# Patient Record
Sex: MALE | Race: WHITE | NOT HISPANIC OR LATINO | URBAN - METROPOLITAN AREA
[De-identification: names, ages, dates, MRNs, and addresses within clinical notes are randomized per-mention and may not be internally consistent; named-entity substitution may affect disease eponyms.]

---

## 2023-04-12 ENCOUNTER — TELEPHONE (OUTPATIENT)
Dept: PRIMARY CARE | Facility: CLINIC | Age: 68
End: 2023-04-12
Payer: MEDICARE

## 2023-04-13 ENCOUNTER — TELEPHONE (OUTPATIENT)
Dept: PRIMARY CARE | Facility: CLINIC | Age: 68
End: 2023-04-13
Payer: MEDICARE

## 2023-04-17 DIAGNOSIS — G43.809 OTHER MIGRAINE WITHOUT STATUS MIGRAINOSUS, NOT INTRACTABLE: Primary | ICD-10-CM

## 2023-04-17 PROBLEM — J45.909 ASTHMA (HHS-HCC): Status: ACTIVE | Noted: 2023-04-17

## 2023-04-17 PROBLEM — M54.9 BACK PAIN, CHRONIC: Status: ACTIVE | Noted: 2023-04-17

## 2023-04-17 PROBLEM — E55.9 VITAMIN D DEFICIENCY: Status: ACTIVE | Noted: 2023-04-17

## 2023-04-17 PROBLEM — G89.29 BACK PAIN, CHRONIC: Status: ACTIVE | Noted: 2023-04-17

## 2023-04-17 PROBLEM — R41.3 MEMORY LOSS: Status: ACTIVE | Noted: 2023-04-17

## 2023-04-17 PROBLEM — J31.0 CHRONIC RHINITIS: Status: ACTIVE | Noted: 2023-04-17

## 2023-04-17 PROBLEM — M19.90 ARTHRITIS: Status: ACTIVE | Noted: 2023-04-17

## 2023-04-17 PROBLEM — K59.00 CONSTIPATION: Status: ACTIVE | Noted: 2023-04-17

## 2023-04-17 PROBLEM — R13.10 DYSPHAGIA: Status: ACTIVE | Noted: 2023-04-17

## 2023-04-17 PROBLEM — R91.1 LUNG NODULE: Status: ACTIVE | Noted: 2023-04-17

## 2023-04-17 PROBLEM — G44.40 MEDICATION OVERUSE HEADACHE: Status: ACTIVE | Noted: 2023-04-17

## 2023-04-17 PROBLEM — R42 LIGHTHEADEDNESS: Status: ACTIVE | Noted: 2023-04-17

## 2023-04-17 PROBLEM — F32.A DEPRESSION: Status: ACTIVE | Noted: 2023-04-17

## 2023-04-17 PROBLEM — K21.9 CHRONIC GERD: Status: ACTIVE | Noted: 2023-04-17

## 2023-04-17 PROBLEM — M54.50 LOW BACK PAIN: Status: ACTIVE | Noted: 2023-04-17

## 2023-04-17 PROBLEM — J04.0 LARYNGITIS: Status: ACTIVE | Noted: 2023-04-17

## 2023-04-17 PROBLEM — J45.909 REACTIVE AIRWAY DISEASE (HHS-HCC): Status: ACTIVE | Noted: 2023-04-17

## 2023-04-17 PROBLEM — H90.5 HIGH FREQUENCY SENSORINEURAL HEARING LOSS OF LEFT EAR: Status: ACTIVE | Noted: 2023-04-17

## 2023-04-17 PROBLEM — R51.9 HEADACHE: Status: ACTIVE | Noted: 2023-04-17

## 2023-04-17 PROBLEM — I10 BENIGN ESSENTIAL HYPERTENSION: Status: ACTIVE | Noted: 2023-04-17

## 2023-04-17 PROBLEM — M47.812 ARTHRITIS OF NECK: Status: ACTIVE | Noted: 2023-04-17

## 2023-04-17 PROBLEM — K44.9 PARAESOPHAGEAL HIATAL HERNIA: Status: ACTIVE | Noted: 2023-04-17

## 2023-04-17 PROBLEM — R41.81 AGE-RELATED COGNITIVE DECLINE: Status: ACTIVE | Noted: 2023-04-17

## 2023-04-17 PROBLEM — F43.23 ADJUSTMENT DISORDER WITH MIXED ANXIETY AND DEPRESSED MOOD: Status: ACTIVE | Noted: 2023-04-17

## 2023-04-17 PROBLEM — G43.711 CHRONIC MIGRAINE WITHOUT AURA, WITH INTRACTABLE MIGRAINE, SO STATED, WITH STATUS MIGRAINOSUS: Status: ACTIVE | Noted: 2023-04-17

## 2023-04-17 PROBLEM — G47.33 OSA (OBSTRUCTIVE SLEEP APNEA): Status: ACTIVE | Noted: 2023-04-17

## 2023-04-17 PROBLEM — F41.8 DEPRESSION WITH ANXIETY: Status: ACTIVE | Noted: 2023-04-17

## 2023-04-17 PROBLEM — R07.89 CHEST PAIN, ATYPICAL: Status: ACTIVE | Noted: 2023-04-17

## 2023-04-17 PROBLEM — D22.4 ATYPICAL NEVUS OF SCALP: Status: ACTIVE | Noted: 2023-04-17

## 2023-04-17 PROBLEM — J06.9 VIRAL URI WITH COUGH: Status: ACTIVE | Noted: 2023-04-17

## 2023-04-17 PROBLEM — K22.70 BARRETT'S ESOPHAGUS: Status: ACTIVE | Noted: 2023-04-17

## 2023-04-17 PROBLEM — M48.02 STENOSIS, CERVICAL SPINE: Status: ACTIVE | Noted: 2023-04-17

## 2023-04-17 PROBLEM — M50.20 DISPLACEMENT OF INTERVERTEBRAL DISC OF CERVICAL REGION: Status: ACTIVE | Noted: 2023-04-17

## 2023-04-17 PROBLEM — R06.02 SHORTNESS OF BREATH ON EXERTION: Status: ACTIVE | Noted: 2023-04-17

## 2023-04-17 PROBLEM — G47.61 PERIODIC LIMB MOVEMENTS OF SLEEP: Status: ACTIVE | Noted: 2023-04-17

## 2023-04-17 PROBLEM — M54.12 CERVICAL RADICULOPATHY: Status: ACTIVE | Noted: 2023-04-17

## 2023-04-17 PROBLEM — G45.9 TIA (TRANSIENT ISCHEMIC ATTACK): Status: ACTIVE | Noted: 2023-04-17

## 2023-04-17 PROBLEM — M47.812 SPONDYLOSIS OF CERVICAL REGION WITHOUT MYELOPATHY OR RADICULOPATHY: Status: ACTIVE | Noted: 2023-04-17

## 2023-04-17 PROBLEM — R73.9 HYPERGLYCEMIA: Status: ACTIVE | Noted: 2023-04-17

## 2023-04-17 PROBLEM — J30.9 ALLERGIC RHINITIS: Status: ACTIVE | Noted: 2023-04-17

## 2023-04-17 PROBLEM — E78.5 HYPERLIPIDEMIA: Status: ACTIVE | Noted: 2023-04-17

## 2023-04-17 PROBLEM — F10.21 ALCOHOLISM IN REMISSION (MULTI): Status: ACTIVE | Noted: 2023-04-17

## 2023-04-17 PROBLEM — M79.18 MYOFASCIAL PAIN ON LEFT SIDE: Status: ACTIVE | Noted: 2023-04-17

## 2023-04-17 PROBLEM — E53.8 VITAMIN B 12 DEFICIENCY: Status: ACTIVE | Noted: 2023-04-17

## 2023-04-17 PROBLEM — R35.1 NOCTURIA: Status: ACTIVE | Noted: 2023-04-17

## 2023-04-17 PROBLEM — H93.19 TINNITUS: Status: ACTIVE | Noted: 2023-04-17

## 2023-04-17 PROBLEM — G56.00 CARPAL TUNNEL SYNDROME: Status: ACTIVE | Noted: 2023-04-17

## 2023-04-17 RX ORDER — COVID-19 MOLECULAR TEST ASSAY
KIT MISCELLANEOUS
COMMUNITY
Start: 2022-05-14 | End: 2023-08-04 | Stop reason: ALTCHOICE

## 2023-04-17 RX ORDER — ESCITALOPRAM OXALATE 10 MG/1
1 TABLET ORAL DAILY
COMMUNITY
Start: 2021-08-19 | End: 2023-08-04 | Stop reason: ALTCHOICE

## 2023-04-17 RX ORDER — ACETAMINOPHEN 160 MG/5ML
1 SUSPENSION, ORAL (FINAL DOSE FORM) ORAL 2 TIMES DAILY
COMMUNITY
End: 2023-08-04 | Stop reason: ALTCHOICE

## 2023-04-17 RX ORDER — MULTIVITAMIN
1 TABLET ORAL DAILY
COMMUNITY
Start: 2014-02-25

## 2023-04-17 RX ORDER — HYDROGEN PEROXIDE 3 %
20 SOLUTION, NON-ORAL MISCELLANEOUS 2 TIMES DAILY
COMMUNITY
Start: 2018-10-05

## 2023-04-17 RX ORDER — ASCORBIC ACID 500 MG
TABLET,CHEWABLE ORAL
COMMUNITY
Start: 2016-07-25 | End: 2023-08-04 | Stop reason: ALTCHOICE

## 2023-04-17 RX ORDER — AMLODIPINE BESYLATE 5 MG/1
1 TABLET ORAL DAILY
COMMUNITY
Start: 2015-09-23 | End: 2023-08-04 | Stop reason: ALTCHOICE

## 2023-04-17 RX ORDER — SUMATRIPTAN SUCCINATE 100 MG/1
100 TABLET ORAL ONCE AS NEEDED
Qty: 9 TABLET | Refills: 3 | Status: SHIPPED | OUTPATIENT
Start: 2023-04-17 | End: 2023-12-18 | Stop reason: SDUPTHER

## 2023-04-17 RX ORDER — UBIDECARENONE 75 MG
CAPSULE ORAL
COMMUNITY
End: 2023-08-04 | Stop reason: ALTCHOICE

## 2023-04-17 RX ORDER — OSELTAMIVIR PHOSPHATE 75 MG/1
1 CAPSULE ORAL 2 TIMES DAILY
COMMUNITY
Start: 2022-12-05 | End: 2023-08-04 | Stop reason: ALTCHOICE

## 2023-04-17 RX ORDER — GUAIFENESIN AND PHENYLEPHRINE HCL 400; 10 MG/1; MG/1
1 TABLET ORAL 2 TIMES DAILY
COMMUNITY
Start: 2017-12-01

## 2023-04-17 RX ORDER — PREDNISONE 10 MG/1
TABLET ORAL
COMMUNITY
Start: 2022-05-17 | End: 2023-08-04 | Stop reason: ALTCHOICE

## 2023-04-17 RX ORDER — AZITHROMYCIN 250 MG/1
TABLET, FILM COATED ORAL
COMMUNITY
Start: 2022-05-17 | End: 2023-08-04 | Stop reason: ALTCHOICE

## 2023-04-17 RX ORDER — SUMATRIPTAN SUCCINATE 100 MG/1
100 TABLET ORAL
COMMUNITY
End: 2023-04-17 | Stop reason: SDUPTHER

## 2023-04-17 RX ORDER — ACETAMINOPHEN 500 MG
TABLET ORAL
COMMUNITY
End: 2023-08-04 | Stop reason: ALTCHOICE

## 2023-04-17 RX ORDER — ONDANSETRON 4 MG/1
1 TABLET, FILM COATED ORAL EVERY 6 HOURS PRN
COMMUNITY
Start: 2022-11-15 | End: 2023-08-04 | Stop reason: ALTCHOICE

## 2023-04-17 RX ORDER — INDOMETHACIN 25 MG/1
CAPSULE ORAL
COMMUNITY
Start: 2014-02-25 | End: 2023-05-04 | Stop reason: SDUPTHER

## 2023-05-04 ENCOUNTER — OFFICE VISIT (OUTPATIENT)
Dept: PRIMARY CARE | Facility: CLINIC | Age: 68
End: 2023-05-04
Payer: MEDICARE

## 2023-05-04 ENCOUNTER — LAB (OUTPATIENT)
Dept: LAB | Facility: LAB | Age: 68
End: 2023-05-04
Payer: MEDICARE

## 2023-05-04 VITALS
SYSTOLIC BLOOD PRESSURE: 128 MMHG | HEART RATE: 76 BPM | DIASTOLIC BLOOD PRESSURE: 82 MMHG | WEIGHT: 184 LBS | OXYGEN SATURATION: 99 % | BODY MASS INDEX: 24.28 KG/M2

## 2023-05-04 DIAGNOSIS — K31.84 GASTROPARESIS: ICD-10-CM

## 2023-05-04 DIAGNOSIS — R73.9 HYPERGLYCEMIA: ICD-10-CM

## 2023-05-04 DIAGNOSIS — Z13.6 ENCOUNTER FOR SCREENING FOR CARDIOVASCULAR DISORDERS: ICD-10-CM

## 2023-05-04 DIAGNOSIS — J45.909 ASTHMA, UNSPECIFIED ASTHMA SEVERITY, UNSPECIFIED WHETHER COMPLICATED, UNSPECIFIED WHETHER PERSISTENT (HHS-HCC): ICD-10-CM

## 2023-05-04 DIAGNOSIS — I10 BENIGN ESSENTIAL HYPERTENSION: ICD-10-CM

## 2023-05-04 DIAGNOSIS — Z13.29 SCREENING FOR THYROID DISORDER: ICD-10-CM

## 2023-05-04 DIAGNOSIS — K21.9 CHRONIC GERD: ICD-10-CM

## 2023-05-04 DIAGNOSIS — E78.5 HYPERLIPIDEMIA, UNSPECIFIED HYPERLIPIDEMIA TYPE: ICD-10-CM

## 2023-05-04 DIAGNOSIS — M54.12 CERVICAL RADICULOPATHY: ICD-10-CM

## 2023-05-04 DIAGNOSIS — Z13.0 SCREENING FOR DEFICIENCY ANEMIA: Primary | ICD-10-CM

## 2023-05-04 DIAGNOSIS — Z13.0 SCREENING FOR DEFICIENCY ANEMIA: ICD-10-CM

## 2023-05-04 DIAGNOSIS — Z00.00 ROUTINE GENERAL MEDICAL EXAMINATION AT A HEALTH CARE FACILITY: ICD-10-CM

## 2023-05-04 DIAGNOSIS — Z12.5 PROSTATE CANCER SCREENING: ICD-10-CM

## 2023-05-04 PROBLEM — K59.1 FUNCTIONAL DIARRHEA: Status: ACTIVE | Noted: 2023-04-25

## 2023-05-04 PROBLEM — R14.2 BELCHING: Status: ACTIVE | Noted: 2023-01-11

## 2023-05-04 PROBLEM — M15.9 GENERALIZED OSTEOARTHRITIS: Status: ACTIVE | Noted: 2017-06-08

## 2023-05-04 PROBLEM — J06.9 VIRAL URI WITH COUGH: Status: RESOLVED | Noted: 2023-04-17 | Resolved: 2023-05-04

## 2023-05-04 LAB
ALANINE AMINOTRANSFERASE (SGPT) (U/L) IN SER/PLAS: 18 U/L (ref 10–52)
ALBUMIN (G/DL) IN SER/PLAS: 4.9 G/DL (ref 3.4–5)
ALKALINE PHOSPHATASE (U/L) IN SER/PLAS: 71 U/L (ref 33–136)
ANION GAP IN SER/PLAS: 14 MMOL/L (ref 10–20)
ASPARTATE AMINOTRANSFERASE (SGOT) (U/L) IN SER/PLAS: 19 U/L (ref 9–39)
BILIRUBIN TOTAL (MG/DL) IN SER/PLAS: 0.7 MG/DL (ref 0–1.2)
CALCIUM (MG/DL) IN SER/PLAS: 10.4 MG/DL (ref 8.6–10.6)
CARBON DIOXIDE, TOTAL (MMOL/L) IN SER/PLAS: 30 MMOL/L (ref 21–32)
CHLORIDE (MMOL/L) IN SER/PLAS: 101 MMOL/L (ref 98–107)
CHOLESTEROL (MG/DL) IN SER/PLAS: 213 MG/DL (ref 0–199)
CHOLESTEROL IN HDL (MG/DL) IN SER/PLAS: 51.3 MG/DL
CHOLESTEROL/HDL RATIO: 4.2
COBALAMIN (VITAMIN B12) (PG/ML) IN SER/PLAS: 795 PG/ML (ref 211–911)
CREATININE (MG/DL) IN SER/PLAS: 1.17 MG/DL (ref 0.5–1.3)
ERYTHROCYTE DISTRIBUTION WIDTH (RATIO) BY AUTOMATED COUNT: 13.2 % (ref 11.5–14.5)
ERYTHROCYTE MEAN CORPUSCULAR HEMOGLOBIN CONCENTRATION (G/DL) BY AUTOMATED: 32.9 G/DL (ref 32–36)
ERYTHROCYTE MEAN CORPUSCULAR VOLUME (FL) BY AUTOMATED COUNT: 85 FL (ref 80–100)
ERYTHROCYTES (10*6/UL) IN BLOOD BY AUTOMATED COUNT: 5.66 X10E12/L (ref 4.5–5.9)
ESTIMATED AVERAGE GLUCOSE FOR HBA1C: 120 MG/DL
GFR MALE: 67 ML/MIN/1.73M2
GLUCOSE (MG/DL) IN SER/PLAS: 121 MG/DL (ref 74–99)
HEMATOCRIT (%) IN BLOOD BY AUTOMATED COUNT: 48 % (ref 41–52)
HEMOGLOBIN (G/DL) IN BLOOD: 15.8 G/DL (ref 13.5–17.5)
HEMOGLOBIN A1C/HEMOGLOBIN TOTAL IN BLOOD: 5.8 %
LDL: 139 MG/DL (ref 0–99)
LEUKOCYTES (10*3/UL) IN BLOOD BY AUTOMATED COUNT: 8.6 X10E9/L (ref 4.4–11.3)
NRBC (PER 100 WBCS) BY AUTOMATED COUNT: 0 /100 WBC (ref 0–0)
PLATELETS (10*3/UL) IN BLOOD AUTOMATED COUNT: 261 X10E9/L (ref 150–450)
POTASSIUM (MMOL/L) IN SER/PLAS: 4.3 MMOL/L (ref 3.5–5.3)
PROTEIN TOTAL: 7.2 G/DL (ref 6.4–8.2)
SODIUM (MMOL/L) IN SER/PLAS: 141 MMOL/L (ref 136–145)
THYROTROPIN (MIU/L) IN SER/PLAS BY DETECTION LIMIT <= 0.05 MIU/L: 2.56 MIU/L (ref 0.44–3.98)
TRIGLYCERIDE (MG/DL) IN SER/PLAS: 114 MG/DL (ref 0–149)
UREA NITROGEN (MG/DL) IN SER/PLAS: 11 MG/DL (ref 6–23)
VLDL: 23 MG/DL (ref 0–40)

## 2023-05-04 PROCEDURE — 84443 ASSAY THYROID STIM HORMONE: CPT

## 2023-05-04 PROCEDURE — 36415 COLL VENOUS BLD VENIPUNCTURE: CPT

## 2023-05-04 PROCEDURE — 82607 VITAMIN B-12: CPT

## 2023-05-04 PROCEDURE — 1160F RVW MEDS BY RX/DR IN RCRD: CPT | Performed by: STUDENT IN AN ORGANIZED HEALTH CARE EDUCATION/TRAINING PROGRAM

## 2023-05-04 PROCEDURE — 1159F MED LIST DOCD IN RCRD: CPT | Performed by: STUDENT IN AN ORGANIZED HEALTH CARE EDUCATION/TRAINING PROGRAM

## 2023-05-04 PROCEDURE — 85027 COMPLETE CBC AUTOMATED: CPT

## 2023-05-04 PROCEDURE — 80053 COMPREHEN METABOLIC PANEL: CPT

## 2023-05-04 PROCEDURE — 80061 LIPID PANEL: CPT

## 2023-05-04 PROCEDURE — 83036 HEMOGLOBIN GLYCOSYLATED A1C: CPT

## 2023-05-04 PROCEDURE — 1170F FXNL STATUS ASSESSED: CPT | Performed by: STUDENT IN AN ORGANIZED HEALTH CARE EDUCATION/TRAINING PROGRAM

## 2023-05-04 PROCEDURE — 3079F DIAST BP 80-89 MM HG: CPT | Performed by: STUDENT IN AN ORGANIZED HEALTH CARE EDUCATION/TRAINING PROGRAM

## 2023-05-04 PROCEDURE — 3074F SYST BP LT 130 MM HG: CPT | Performed by: STUDENT IN AN ORGANIZED HEALTH CARE EDUCATION/TRAINING PROGRAM

## 2023-05-04 PROCEDURE — G0439 PPPS, SUBSEQ VISIT: HCPCS | Performed by: STUDENT IN AN ORGANIZED HEALTH CARE EDUCATION/TRAINING PROGRAM

## 2023-05-04 RX ORDER — SIMVASTATIN 40 MG/1
TABLET, FILM COATED ORAL
COMMUNITY
Start: 2009-03-23 | End: 2023-08-04 | Stop reason: ALTCHOICE

## 2023-05-04 RX ORDER — INDOMETHACIN 25 MG/1
25 CAPSULE ORAL
COMMUNITY
End: 2023-05-04 | Stop reason: SDUPTHER

## 2023-05-04 RX ORDER — CETIRIZINE HYDROCHLORIDE, PSEUDOEPHEDRINE HYDROCHLORIDE 5; 120 MG/1; MG/1
TABLET, FILM COATED, EXTENDED RELEASE ORAL
COMMUNITY
End: 2023-08-04 | Stop reason: ALTCHOICE

## 2023-05-04 RX ORDER — FLUOROURACIL 50 MG/G
CREAM TOPICAL
COMMUNITY
End: 2023-08-04 | Stop reason: ALTCHOICE

## 2023-05-04 RX ORDER — INDOMETHACIN 25 MG/1
25 CAPSULE ORAL
Qty: 60 CAPSULE | Refills: 11 | Status: SHIPPED | OUTPATIENT
Start: 2023-05-04 | End: 2024-05-03

## 2023-05-04 RX ORDER — TOPIRAMATE 25 MG/1
TABLET ORAL
COMMUNITY
End: 2023-08-04 | Stop reason: ALTCHOICE

## 2023-05-04 RX ORDER — AMITRIPTYLINE HYDROCHLORIDE 50 MG/1
TABLET, FILM COATED ORAL
COMMUNITY
End: 2023-08-04 | Stop reason: ALTCHOICE

## 2023-05-04 RX ORDER — METOPROLOL SUCCINATE 50 MG/1
TABLET, EXTENDED RELEASE ORAL
COMMUNITY
End: 2023-08-04 | Stop reason: ALTCHOICE

## 2023-05-04 RX ORDER — METOCLOPRAMIDE 5 MG/1
TABLET ORAL
COMMUNITY
Start: 2021-05-28 | End: 2023-08-04 | Stop reason: ALTCHOICE

## 2023-05-04 RX ORDER — PANCRELIPASE 24000; 76000; 120000 [USP'U]/1; [USP'U]/1; [USP'U]/1
CAPSULE, DELAYED RELEASE PELLETS ORAL
COMMUNITY
Start: 2021-05-28 | End: 2023-08-04 | Stop reason: ALTCHOICE

## 2023-05-04 RX ORDER — ASPIRIN 81 MG/1
81 TABLET ORAL DAILY
COMMUNITY

## 2023-05-04 RX ORDER — PRAVASTATIN SODIUM 40 MG/1
TABLET ORAL
COMMUNITY
End: 2023-08-04 | Stop reason: ALTCHOICE

## 2023-05-04 RX ORDER — DOCUSATE SODIUM 100 MG/1
CAPSULE, LIQUID FILLED ORAL
COMMUNITY
Start: 2017-06-03 | End: 2023-08-04 | Stop reason: ALTCHOICE

## 2023-05-04 RX ORDER — NIACIN (INOSITOL NIACINATE) 400(500MG)
CAPSULE ORAL
COMMUNITY
End: 2023-08-04 | Stop reason: ALTCHOICE

## 2023-05-04 ASSESSMENT — ENCOUNTER SYMPTOMS
LOSS OF SENSATION IN FEET: 0
OCCASIONAL FEELINGS OF UNSTEADINESS: 0
DEPRESSION: 0

## 2023-05-04 ASSESSMENT — PATIENT HEALTH QUESTIONNAIRE - PHQ9
1. LITTLE INTEREST OR PLEASURE IN DOING THINGS: NOT AT ALL
2. FEELING DOWN, DEPRESSED OR HOPELESS: NOT AT ALL
SUM OF ALL RESPONSES TO PHQ9 QUESTIONS 1 AND 2: 0

## 2023-05-04 ASSESSMENT — ACTIVITIES OF DAILY LIVING (ADL)
TAKING_MEDICATION: INDEPENDENT
BATHING: INDEPENDENT
DOING_HOUSEWORK: INDEPENDENT
MANAGING_FINANCES: INDEPENDENT
DRESSING: INDEPENDENT
GROCERY_SHOPPING: INDEPENDENT

## 2023-05-04 NOTE — PATIENT INSTRUCTIONS
1. Medicare wellness.  No concerns on exam.  Screening labs ordered today.  Pneumonia shots are up-to-date.  Excellent job with diet and exercise.  Very good weight no longer needing blood pressure pills.    2. History of Hernandez's esophagus, chronic GERD, gastroparesis.  Continue routine follow-ups with GI provider we would advise on serial endoscopies every 2 years.     3. Continue routine follow-ups with urology. It was noted you had a 1.5 cm epididymal cyst on the left side    #4.  Has had some issues with watery loose stool.  No infectious symptoms no significant abdominal pain no blood in stool.  Advised on keeping a food journal, trying to eliminate certain foods and see what the culprit is.    5.  Chronic neck pain chronic headaches.  Advised on his strength program to improve neck posture.    #6.  No longer needing anxiety pills

## 2023-05-04 NOTE — PROGRESS NOTES
Subjective   Patient ID: Chavo Jordan Sr. is a 68 y.o. male who presents for Medicare Annual Wellness Visit Subsequent (He is not fasting.).    HPI comes in for Medicare wellness.    Review of Systems  Constitutional: NO F, chills, or sweats  Eyes: no blurred vision or visual disturbance  ENT: no hearing loss, no congestion, no nasal discharge, no hoarseness and no sore throat.   Cardiovascular: no chest pain, no edema, no palps and no syncope.   Respiratory: no cough,no s.o.b. and no wheezing  Gastrointestinal: History of gastroparesis, he has had some watery loose stool for about 8 weeks  Genitourinary: no dysuria, no change in urinary frequency, no urinary hesitancy and no feelings of urinary urgency.   Musculoskeletal: Chronic neck pain  Integumentary: no new skin lesions and no rashes.   Neurological: no difficulty walking, gets fairly frequent headaches related to neck pain flareups no limb weakness, no numbness and no tingling.   Psychiatric: no anxiety, no depression, no anhedonia and no substance use disorders.   Endocrine: no recent weight gain and no recent weight loss.   Hematologic/Lymphatic: no tendency for easy bruising and no swollen glands.  Objective   /82 (BP Location: Left arm, Patient Position: Sitting, BP Cuff Size: Adult)   Pulse 76   Wt 83.5 kg (184 lb)   SpO2 99%   BMI 24.28 kg/m²     Physical Exam  gen- a & o x 3, nad, pleasant  heent- eomi, perrla, ear canals patent, TM's non-erythematous, no fluid, frontal and maxillary sinus's nontender  neck- supple, nontender, no palpable or enlarged nodes, no thyromegaly  heart- rrr, no murmurs  lungs- cta b/l , no w/r/r  chest- symmetric, nontender  ab- soft, nontender, no palpable organomegaly, postive bowel sounds  ex's- no c/c/e  neuro- CNs 2-12 grossly intact, full sensation and strength in all extremities    Assessment/Plan     1. Medicare wellness.  No concerns on exam.  Screening labs ordered today.  Pneumonia shots are  up-to-date.  Excellent job with diet and exercise.  Very good weight no longer needing blood pressure pills.    2. History of Hernandez's esophagus, chronic GERD, gastroparesis.  Continue routine follow-ups with GI provider we would advise on serial endoscopies every 2 years.     3. Continue routine follow-ups with urology. It was noted you had a 1.5 cm epididymal cyst on the left side    #4.  Has had some issues with watery loose stool.  No infectious symptoms no significant abdominal pain no blood in stool.  Advised on keeping a food journal, trying to eliminate certain foods and see what the culprit is.    5.  Chronic neck pain chronic headaches.  Advised on his strength program to improve neck posture.    #6.  No longer needing anxiety pills

## 2023-08-04 ENCOUNTER — OFFICE VISIT (OUTPATIENT)
Dept: PRIMARY CARE | Facility: CLINIC | Age: 68
End: 2023-08-04
Payer: MEDICARE

## 2023-08-04 VITALS
SYSTOLIC BLOOD PRESSURE: 116 MMHG | HEART RATE: 75 BPM | DIASTOLIC BLOOD PRESSURE: 70 MMHG | WEIGHT: 189.8 LBS | BODY MASS INDEX: 25.04 KG/M2 | OXYGEN SATURATION: 97 %

## 2023-08-04 DIAGNOSIS — Z13.6 ENCOUNTER FOR SCREENING FOR CARDIOVASCULAR DISORDERS: ICD-10-CM

## 2023-08-04 DIAGNOSIS — R42 LIGHTHEADEDNESS: ICD-10-CM

## 2023-08-04 DIAGNOSIS — R06.02 SOB (SHORTNESS OF BREATH) ON EXERTION: Primary | ICD-10-CM

## 2023-08-04 DIAGNOSIS — E78.5 HYPERLIPIDEMIA, UNSPECIFIED HYPERLIPIDEMIA TYPE: ICD-10-CM

## 2023-08-04 PROBLEM — F09 COGNITIVE DISORDER: Status: ACTIVE | Noted: 2023-08-04

## 2023-08-04 PROCEDURE — 1160F RVW MEDS BY RX/DR IN RCRD: CPT | Performed by: STUDENT IN AN ORGANIZED HEALTH CARE EDUCATION/TRAINING PROGRAM

## 2023-08-04 PROCEDURE — 3074F SYST BP LT 130 MM HG: CPT | Performed by: STUDENT IN AN ORGANIZED HEALTH CARE EDUCATION/TRAINING PROGRAM

## 2023-08-04 PROCEDURE — 99213 OFFICE O/P EST LOW 20 MIN: CPT | Performed by: STUDENT IN AN ORGANIZED HEALTH CARE EDUCATION/TRAINING PROGRAM

## 2023-08-04 PROCEDURE — 1036F TOBACCO NON-USER: CPT | Performed by: STUDENT IN AN ORGANIZED HEALTH CARE EDUCATION/TRAINING PROGRAM

## 2023-08-04 PROCEDURE — 3078F DIAST BP <80 MM HG: CPT | Performed by: STUDENT IN AN ORGANIZED HEALTH CARE EDUCATION/TRAINING PROGRAM

## 2023-08-04 PROCEDURE — 1159F MED LIST DOCD IN RCRD: CPT | Performed by: STUDENT IN AN ORGANIZED HEALTH CARE EDUCATION/TRAINING PROGRAM

## 2023-08-04 PROCEDURE — 1126F AMNT PAIN NOTED NONE PRSNT: CPT | Performed by: STUDENT IN AN ORGANIZED HEALTH CARE EDUCATION/TRAINING PROGRAM

## 2023-08-04 ASSESSMENT — ENCOUNTER SYMPTOMS: DEPRESSION: 0

## 2023-08-04 ASSESSMENT — PAIN SCALES - GENERAL: PAINLEVEL: 0-NO PAIN

## 2023-08-04 NOTE — PATIENT INSTRUCTIONS
Toño Jones DO  for questions and f/u please call office   [place] [phone] [facility] [phone]  any forms needed please fax   [place] [phone] [facility] [phone]  for Physical therapy orders can call [phone]  for Radiology orders can call [phone]  for general referrals can call [phone]  for cardiac testing can call [phone]

## 2023-08-04 NOTE — PROGRESS NOTES
Subjective   Patient ID: Chavo Jordan Sr. is a 68 y.o. male who presents for Follow-up (Right side chest pain.).    HPI comes in for ER follow-up right-sided chest wall discomfort.  Work-up was negative scan for blood clot as well.  Pain is mostly resolved.    Review of Systems  Constitutional: NO F, chills, or sweats  Eyes: no blurred vision or visual disturbance  ENT: no hearing loss, no congestion, no nasal discharge, no hoarseness and no sore throat.   Cardiovascular: no chest pain, no edema, no palps and no syncope.   Respiratory: no cough,no s.o.b. and no wheezing  Gastrointestinal: no abdominal pain, No C/D no N/V, no blood in stools  Genitourinary: no dysuria, no change in urinary frequency, no urinary hesitancy and no feelings of urinary urgency.   Musculoskeletal: Follows up after episode of right-sided chest wall pain  Integumentary: no new skin lesions and no rashes.   Neurological: no difficulty walking, no headache, no limb weakness, no numbness and no tingling.   Psychiatric: no anxiety, no depression, no anhedonia and no substance use disorders.   Endocrine: no recent weight gain and no recent weight loss.   Hematologic/Lymphatic: no tendency for easy bruising and no swollen glands.  Objective   /70 (BP Location: Left arm, Patient Position: Sitting, BP Cuff Size: Adult)   Pulse 75   Wt 86.1 kg (189 lb 12.8 oz)   SpO2 97%   BMI 25.04 kg/m²     Physical Exam  gen- a & o x 3, nad, pleasant  heent- eomi, perrla, ear canals patent, TM's non-erythematous, no fluid, frontal and maxillary sinus's nontender  neck- supple, nontender, no palpable or enlarged nodes, no thyromegaly  heart- rrr, no murmurs  lungs- cta b/l , no w/r/r  chest- symmetric, nontender    Assessment/Plan     1.  ER follow-up after chest pain episode.  He was negative for PE and heart attack in the ER.  For further work-up we will order CT calcium score of chest.  Carotid duplex ultrasound.  Continue excellent job with  healthy diet and fitness

## 2023-10-16 ENCOUNTER — ANCILLARY PROCEDURE (OUTPATIENT)
Dept: RADIOLOGY | Facility: CLINIC | Age: 68
End: 2023-10-16
Payer: MEDICARE

## 2023-10-16 DIAGNOSIS — Z13.6 ENCOUNTER FOR SCREENING FOR CARDIOVASCULAR DISORDERS: ICD-10-CM

## 2023-10-16 DIAGNOSIS — E78.5 HYPERLIPIDEMIA, UNSPECIFIED: ICD-10-CM

## 2023-10-16 DIAGNOSIS — R06.02 SHORTNESS OF BREATH: ICD-10-CM

## 2023-10-16 PROCEDURE — 75571 CT HRT W/O DYE W/CA TEST: CPT

## 2023-10-18 ENCOUNTER — TELEPHONE (OUTPATIENT)
Dept: PRIMARY CARE | Facility: CLINIC | Age: 68
End: 2023-10-18
Payer: MEDICARE

## 2023-10-18 DIAGNOSIS — F32.A ANXIETY AND DEPRESSION: Primary | ICD-10-CM

## 2023-10-18 DIAGNOSIS — F41.9 ANXIETY AND DEPRESSION: Primary | ICD-10-CM

## 2023-10-18 RX ORDER — BUSPIRONE HYDROCHLORIDE 15 MG/1
15 TABLET ORAL 2 TIMES DAILY
Qty: 60 TABLET | Refills: 2 | Status: SHIPPED | OUTPATIENT
Start: 2023-10-18 | End: 2023-12-27 | Stop reason: SDUPTHER

## 2023-10-18 NOTE — TELEPHONE ENCOUNTER
Patient left a VM stating he is going through a rough patch due to death in the family. Requesting an anti-depressant be sent to his pharmacy on file.

## 2023-12-18 DIAGNOSIS — G43.809 OTHER MIGRAINE WITHOUT STATUS MIGRAINOSUS, NOT INTRACTABLE: ICD-10-CM

## 2023-12-18 RX ORDER — SUMATRIPTAN SUCCINATE 100 MG/1
100 TABLET ORAL ONCE AS NEEDED
Qty: 9 TABLET | Refills: 3 | Status: SHIPPED | OUTPATIENT
Start: 2023-12-18 | End: 2024-12-17

## 2023-12-27 DIAGNOSIS — F41.9 ANXIETY AND DEPRESSION: ICD-10-CM

## 2023-12-27 DIAGNOSIS — F32.A ANXIETY AND DEPRESSION: ICD-10-CM

## 2023-12-27 RX ORDER — BUSPIRONE HYDROCHLORIDE 15 MG/1
15 TABLET ORAL 2 TIMES DAILY
Qty: 60 TABLET | Refills: 2 | Status: SHIPPED | OUTPATIENT
Start: 2023-12-27 | End: 2024-04-22 | Stop reason: SDUPTHER

## 2024-04-22 ENCOUNTER — TELEPHONE (OUTPATIENT)
Dept: PRIMARY CARE | Facility: CLINIC | Age: 69
End: 2024-04-22
Payer: MEDICARE

## 2024-04-22 DIAGNOSIS — F41.9 ANXIETY AND DEPRESSION: ICD-10-CM

## 2024-04-22 DIAGNOSIS — F32.A ANXIETY AND DEPRESSION: ICD-10-CM

## 2024-04-22 RX ORDER — MUPIROCIN 20 MG/G
OINTMENT TOPICAL
COMMUNITY
Start: 2023-11-06

## 2024-04-22 RX ORDER — FLUOROURACIL 50 MG/G
CREAM TOPICAL
COMMUNITY
Start: 2023-11-06

## 2024-04-22 RX ORDER — BUSPIRONE HYDROCHLORIDE 15 MG/1
15 TABLET ORAL 2 TIMES DAILY
Qty: 60 TABLET | Refills: 2 | Status: SHIPPED | OUTPATIENT
Start: 2024-04-22 | End: 2025-04-22

## 2024-04-22 NOTE — TELEPHONE ENCOUNTER
Patient left a  requesting a refill for buspirone 15mg BID. He is on a camping trip now and it trying to wean himself off this medication. He has cut them in half now. He is not feeling depressed or anxious at this time. Please send refill to [facility] on file.

## 2024-07-08 NOTE — PROGRESS NOTES
Subjective     Chief Complaint: Headache    Chavo Jordan . is a 69 y.o. year old male who presents with chief complaint of headaches.    HPI    Chavo started getting headaches at age 8.  Headaches gradually worsening in frequency and severity over the course of 60 years. Generally, headaches last about  1-2  hours in duration, some last for 4 days, twice a month. Patient otherwise has 14/30 headache days per month. The headaches are usually pressure around the eye, can travel to the neck or back of the head. symmetric. The patient rates her most severe headaches a 4 in intensity, indomethacin knocks headaches to a 0-1. Takes Sumatriptan with indomethacin half the time so he doesn't run out of pills. Associated nausea, photophobia, and phonophobia. Headaches are worsened with exertion. Triggers include waking up in the morning, looking upwards for long periods of time, TV screens, hungry, bad smells.    Pt was a patient of Dr. Cui's from 10/2019-4/2020. Here he received Botox treatment. Prior to this was seen at [facility] in the 90s. States he has seen multiple neurologists. Botox was not effective after 2 rounds. History of sleep apnea, states it went away after he lost weight, no longer wearing BiPap. Wife states pt is snoring again. Sleeping 7-8 hours, not restful sleep, not refreshed in the morning.    Pt wakes up with a headache almost every morning. Stretching, hot showers, NSAIDs, excedrin, sumatriptan helps.    Chavo does not experience headache aura. States he used to in his 20s, but no longer experiencing. Associated symptoms including tinnitus. Denies any focal symptoms, including weakness, paresthesias, vision changes, dizziness, imbalance.    Work attendance or other daily activities are affected by the headaches.    Patient also c/f cognitive decline. Wife Maritza also concerned. Gets lost at stop signs, increasing forgetfulness. Sense of balance off, unable to fix things like he use to.  States he  is taking donepezil as of 1 month ago, prescribed by PCP.    Current Acute Headache Treatment Indomethacin, Excedrin, sumatriptan (cuts in half),    Current Preventative Headache Treatment 4-5 cups coffee/day   Previous Acute Headache Treatment Sumatriptan oral and injection. Oral now  Maxalt  Ergotamine- effective but was off the market for a while.   Previous Preventative Headache Treatment topamax- Does not remember impact but no longer taking  Amitriptyline ineffective  Nortriptyline ineffective  Depakote ineffective  Propranolol ineffective  Emgality ineffective Caused headache and not covered well.   Cymbalta ineffective  Botox x2-3       ROS: As per HPI, otherwise all other systems have been reviewed are negative for complaint.     Family history: migraines with mom, son and daughter.     Past Medical History:   Diagnosis Date    Cervicalgia     Neck pain    Cervicalgia     Neck pain    Chronic rhinitis 06/21/2019    Chronic rhinitis    Migraine, unspecified, not intractable, without status migrainosus 10/29/2019    Migraine    Obstructive sleep apnea (adult) (pediatric) 11/21/2018    LENORE (obstructive sleep apnea)    Other intervertebral disc degeneration, lumbar region     DDD (degenerative disc disease), lumbar    Personal history of other diseases of the circulatory system     Personal history of coronary atherosclerosis    Personal history of other diseases of the digestive system     History of gastroesophageal reflux (GERD)    Personal history of other diseases of the musculoskeletal system and connective tissue     History of osteoarthritis    Personal history of other diseases of the nervous system and sense organs 09/06/2018    History of hearing loss    Personal history of other diseases of the nervous system and sense organs 05/18/2015    History of migraine    Personal history of other diseases of the nervous system and sense organs 03/16/2015    History of eustachian tube dysfunction    Personal  history of other diseases of the respiratory system 03/30/2015    History of upper respiratory infection    Personal history of other endocrine, nutritional and metabolic disease     History of hypercholesterolemia    Personal history of other specified conditions     History of syncope    Personal history of other specified conditions 12/05/2016    History of persistent cough    Transient global amnesia     Transient global amnesia     Past Surgical History:   Procedure Laterality Date    APPENDECTOMY  03/03/2014    Appendectomy    OTHER SURGICAL HISTORY  09/06/2018    Anterior Gastropexy For Hiatal Hernia    OTHER SURGICAL HISTORY  03/03/2014    Distal Reinsertion Of Ruptured Biceps Tendon    VASECTOMY  02/25/2014    Surgery Vas Deferens Vasectomy     No family history on file.  Social History     Tobacco Use    Smoking status: Former     Types: Cigarettes    Smokeless tobacco: Never   Substance Use Topics    Alcohol use: Never        Objective   There were no vitals taken for this visit.    Neuro Exam:  Cardiac Exam: No apparent edema of b/l lower extremities  Neurological Exam:  MENTAL STATUS:   General Appearance: No distress, alert, interactive, and cooperative. Orientation was normal to time, place and person. Recent and remote memory was intact.     CRANIAL NERVES:   CN 2         Visual fields full to confrontation.   CN 3, 4, 6   Pupils round, 4 mm in diameter, equally reactive to light. Lids symmetric; no ptosis. EOMs normal alignment, full range with normal saccades, pursuit and convergence.   No nystagmus.   CN 5   Facial sensation intact bilaterally.   CN 7   Normal and symmetric facial strength. Nasolabial folds symmetric.   CN 8   Hearing intact to conversation and finger rub.  CN 9, 10   Palate elevates symmetrically.  CN 11   Normal strength of shoulder shrug and neck turning.   CN 12   Tongue midline, with normal bulk and strength; no fasciculations.     MOTOR:   Muscle bulk and tone were normal in  both upper and lower extremities.   No pronator drift bilaterally.  No fasciculations, tremor or other abnormal movements evident with the patient examined clothed.    STRENGTH:  R  L  Deltoid            5          5  Biceps  5 5  Triceps  5 5    Hip flexion 5 5  Knee Flex 5 5  Knee Ex 5 5    REFLEXES: R L  Biceps  2 2                     Triceps  2 2  Patellar  2 2     SENSORY:   In both upper and lower extremities, sensation was intact to light touch.    COORDINATION:   In both upper extremities, finger-nose-finger was intact without dysmetria or overshoot.     GAIT:   Station was stable with a normal base. Gait was stable with a normal arm swing and speed. No ataxia, shuffling, steppage or waddling was present. No circumduction was present. No Romberg sign was present.    Physical Exam    Results  No visits with results within 2 Month(s) from this visit.   Latest known visit with results is:   Lab on 05/04/2023   Component Date Value    WBC 05/04/2023 8.6     nRBC 05/04/2023 0.0     RBC 05/04/2023 5.66     Hemoglobin 05/04/2023 15.8     Hematocrit 05/04/2023 48.0     MCV 05/04/2023 85     MCHC 05/04/2023 32.9     Platelets 05/04/2023 261     RDW 05/04/2023 13.2     Glucose 05/04/2023 121 (H)     Sodium 05/04/2023 141     Potassium 05/04/2023 4.3     Chloride 05/04/2023 101     Bicarbonate 05/04/2023 30     Anion Gap 05/04/2023 14     Urea Nitrogen 05/04/2023 11     Creatinine 05/04/2023 1.17     GFR MALE 05/04/2023 67     Calcium 05/04/2023 10.4     Albumin 05/04/2023 4.9     Alkaline Phosphatase 05/04/2023 71     Total Protein 05/04/2023 7.2     AST 05/04/2023 19     Total Bilirubin 05/04/2023 0.7     ALT (SGPT) 05/04/2023 18     Hemoglobin A1C 05/04/2023 5.8 (A)     Estimated Average Glucose 05/04/2023 120     Cholesterol 05/04/2023 213 (H)     HDL 05/04/2023 51.3     Cholesterol/HDL Ratio 05/04/2023 4.2     LDL 05/04/2023 139 (H)     VLDL 05/04/2023 23     Triglycerides 05/04/2023 114     Vitamin B-12  05/04/2023 795     TSH 05/04/2023 2.56        CT Head without IV Contrast: No results found for this or any previous visit from the past 365 days.     MR Brain with and without IV Contrast: No results found for this or any previous visit from the past 365 days.    MR Angio Head with and without IV Contrast: No results found for this or any previous visit from the past 365 days.    Assessment/Plan     Chavo Jordan is a 68yo male with PMHx of migraines, OA, LENORE, HLD, anxiety, presenting to establish care for migraines. Given the frequency and description of headaches, Chavo likely continues to be experiencing migraines. It appears the migraines are cervicogenic, with multiple starting in the neck, occur in the morning with neck pain upon awakening, and improvement with stretching. No red flag symptoms to indicate imaging at this time. It appears LENORE has returned as pt does not feel refreshed from sleep and is starting to snore again.    Plan:  -Per our discussion, we will start Robaxin for 2 weeks qhs for headache prevention.   -In addition, we will start Nurtec every other day for headache prevention  -C/w sumatriptan and indomethacin for acute headache treatment.    -Sleep referral to assess for LENORE and need for CPAP/BiPap  -Please keep a headache diary for each headache to better characterize these events, use migraine fallon kaya to help track migraines  -Can consider nerve block in the future if these interventions do not improve symptoms    I personally spent 60 minutes today, exclusive of procedures, providing care for this patient, including preparation, face to face time, documentation and other services such as review of medical records, diagnostic result, patient education, counseling, coordination of care as specified in the encounter.     Seen and discussed with attending Dr. Neal,    Arya Jaimes DO  PGY-2 Neurology

## 2024-07-09 ENCOUNTER — OFFICE VISIT (OUTPATIENT)
Dept: NEUROLOGY | Facility: HOSPITAL | Age: 69
End: 2024-07-09
Payer: MEDICARE

## 2024-07-09 VITALS
WEIGHT: 189 LBS | HEART RATE: 64 BPM | TEMPERATURE: 97.5 F | BODY MASS INDEX: 25.05 KG/M2 | DIASTOLIC BLOOD PRESSURE: 78 MMHG | RESPIRATION RATE: 18 BRPM | SYSTOLIC BLOOD PRESSURE: 143 MMHG | HEIGHT: 73 IN

## 2024-07-09 DIAGNOSIS — G43.711 CHRONIC MIGRAINE WITHOUT AURA, WITH INTRACTABLE MIGRAINE, SO STATED, WITH STATUS MIGRAINOSUS: Primary | ICD-10-CM

## 2024-07-09 DIAGNOSIS — G47.33 OSA (OBSTRUCTIVE SLEEP APNEA): ICD-10-CM

## 2024-07-09 PROCEDURE — 99205 OFFICE O/P NEW HI 60 MIN: CPT | Performed by: PSYCHIATRY & NEUROLOGY

## 2024-07-09 PROCEDURE — 3077F SYST BP >= 140 MM HG: CPT | Performed by: PSYCHIATRY & NEUROLOGY

## 2024-07-09 PROCEDURE — 1126F AMNT PAIN NOTED NONE PRSNT: CPT | Performed by: PSYCHIATRY & NEUROLOGY

## 2024-07-09 PROCEDURE — 99215 OFFICE O/P EST HI 40 MIN: CPT | Mod: GC | Performed by: PSYCHIATRY & NEUROLOGY

## 2024-07-09 PROCEDURE — G2211 COMPLEX E/M VISIT ADD ON: HCPCS | Performed by: PSYCHIATRY & NEUROLOGY

## 2024-07-09 PROCEDURE — 3078F DIAST BP <80 MM HG: CPT | Performed by: PSYCHIATRY & NEUROLOGY

## 2024-07-09 RX ORDER — METHOCARBAMOL 500 MG/1
500 TABLET, FILM COATED ORAL 3 TIMES DAILY
Qty: 15 TABLET | Refills: 0 | Status: SHIPPED | OUTPATIENT
Start: 2024-07-09 | End: 2024-07-14

## 2024-07-09 ASSESSMENT — PAIN SCALES - GENERAL: PAINLEVEL: 0-NO PAIN

## 2024-07-09 NOTE — PATIENT INSTRUCTIONS
-Per our discussion, we will start Robaxin (muscle relaxer) for 2 weeks every evening for headache prevention.   -In addition, we will start Nurtec every other day for headache prevention  -Continue with sumatriptan and indomethacin for acute headache treatment.    -Sleep referral to assess for sleep apnea and need for CPAP/BiPap  -Please keep a headache diary for each headache to better characterize these events, download migraine fallon kaya to track migraines  -Please return to clinic in 3 months    It was a pleasure seeing you in clinic today!

## 2024-07-09 NOTE — LETTER
July 9, 2024     Justice Neal MD  [address]  Department Of Neurology  [facility] [address]    Patient: Chavo Jordan Sr.   YOB: 1955   Date of Visit: 7/9/2024       Dear Dr. Justice Neal MD:    Thank you for referring Chavo Jordan to me for evaluation. Below are my notes for this consultation.  If you have questions, please do not hesitate to call me. I look forward to following your patient along with you.       Sincerely,     Justice Neal MD      CC: No Recipients  ______________________________________________________________________________________    Subjective     Chief Complaint: Headache    Chavo Jordan Sr. is a 69 y.o. year old male who presents with chief complaint of headaches.    HPI    Chavo started getting headaches at age 8.  Headaches gradually worsening in frequency and severity over the course of 60 years. Generally, headaches last about  1-2  hours in duration, some last for 4 days, twice a month. Patient otherwise has 14/30 headache days per month. The headaches are usually pressure around the eye, can travel to the neck or back of the head. symmetric. The patient rates her most severe headaches a 4 in intensity, indomethacin knocks headaches to a 0-1. Takes Sumatriptan with indomethacin half the time so he doesn't run out of pills. Associated nausea, photophobia, and phonophobia. Headaches are worsened with exertion. Triggers include waking up in the morning, looking upwards for long periods of time, TV screens, hungry, bad smells.    Pt was a patient of Dr. Cui's from 10/2019-4/2020. Here he received Botox treatment. Prior to this was seen at [facility] in the [facility]s. States he has seen multiple neurologists. Botox was not effective after 2 rounds. History of sleep apnea, states it went away after he lost weight, no longer wearing BiPap. Wife states pt is snoring again. Sleeping 7-8 hours, not restful sleep, not refreshed in the morning.    Pt wakes up with a  headache almost every morning. Stretching, hot showers, NSAIDs, excedrin, sumatriptan helps.    Chavo does not experience headache aura. States he used to in his 20s, but no longer experiencing. Associated symptoms including tinnitus. Denies any focal symptoms, including weakness, paresthesias, vision changes, dizziness, imbalance.    Work attendance or other daily activities are affected by the headaches.    Patient also c/f cognitive decline. Wife Maritza also concerned. Gets lost at stop signs, increasing forgetfulness. Sense of balance off, unable to fix things like he use to.  States he is taking donepezil as of 1 month ago, prescribed by PCP.    Current Acute Headache Treatment Indomethacin, Excedrin, sumatriptan (cuts in half),    Current Preventative Headache Treatment 4-5 cups coffee/day   Previous Acute Headache Treatment Sumatriptan oral and injection. Oral now  Maxalt  Ergotamine- effective but was off the market for a while.   Previous Preventative Headache Treatment topamax- Does not remember impact but no longer taking  Amitriptyline ineffective  Nortriptyline ineffective  Depakote ineffective  Propranolol ineffective  Emgality ineffective Caused headache and not covered well.   Cymbalta ineffective  Botox x2-3       ROS: As per HPI, otherwise all other systems have been reviewed are negative for complaint.     Family history: migraines with mom, son and daughter.     Past Medical History:   Diagnosis Date   • Cervicalgia     Neck pain   • Cervicalgia     Neck pain   • Chronic rhinitis 06/21/2019    Chronic rhinitis   • Migraine, unspecified, not intractable, without status migrainosus 10/29/2019    Migraine   • Obstructive sleep apnea (adult) (pediatric) 11/21/2018    LENORE (obstructive sleep apnea)   • Other intervertebral disc degeneration, lumbar region     DDD (degenerative disc disease), lumbar   • Personal history of other diseases of the circulatory system     Personal history of coronary  atherosclerosis   • Personal history of other diseases of the digestive system     History of gastroesophageal reflux (GERD)   • Personal history of other diseases of the musculoskeletal system and connective tissue     History of osteoarthritis   • Personal history of other diseases of the nervous system and sense organs 09/06/2018    History of hearing loss   • Personal history of other diseases of the nervous system and sense organs 05/18/2015    History of migraine   • Personal history of other diseases of the nervous system and sense organs 03/16/2015    History of eustachian tube dysfunction   • Personal history of other diseases of the respiratory system 03/30/2015    History of upper respiratory infection   • Personal history of other endocrine, nutritional and metabolic disease     History of hypercholesterolemia   • Personal history of other specified conditions     History of syncope   • Personal history of other specified conditions 12/05/2016    History of persistent cough   • Transient global amnesia     Transient global amnesia     Past Surgical History:   Procedure Laterality Date   • APPENDECTOMY  03/03/2014    Appendectomy   • OTHER SURGICAL HISTORY  09/06/2018    Anterior Gastropexy For Hiatal Hernia   • OTHER SURGICAL HISTORY  03/03/2014    Distal Reinsertion Of Ruptured Biceps Tendon   • VASECTOMY  02/25/2014    Surgery Vas Deferens Vasectomy     No family history on file.  Social History     Tobacco Use   • Smoking status: Former     Types: Cigarettes   • Smokeless tobacco: Never   Substance Use Topics   • Alcohol use: Never        Objective   There were no vitals taken for this visit.    Neuro Exam:  Cardiac Exam: No apparent edema of b/l lower extremities  Neurological Exam:  MENTAL STATUS:   General Appearance: No distress, alert, interactive, and cooperative. Orientation was normal to time, place and person. Recent and remote memory was intact.     CRANIAL NERVES:   CN 2         Visual  fields full to confrontation.   CN 3, 4, 6   Pupils round, 4 mm in diameter, equally reactive to light. Lids symmetric; no ptosis. EOMs normal alignment, full range with normal saccades, pursuit and convergence.   No nystagmus.   CN 5   Facial sensation intact bilaterally.   CN 7   Normal and symmetric facial strength. Nasolabial folds symmetric.   CN 8   Hearing intact to conversation and finger rub.  CN 9, 10   Palate elevates symmetrically.  CN 11   Normal strength of shoulder shrug and neck turning.   CN 12   Tongue midline, with normal bulk and strength; no fasciculations.     MOTOR:   Muscle bulk and tone were normal in both upper and lower extremities.   No pronator drift bilaterally.  No fasciculations, tremor or other abnormal movements evident with the patient examined clothed.    STRENGTH:  R  L  Deltoid            5          5  Biceps  5 5  Triceps  5 5    Hip flexion 5 5  Knee Flex 5 5  Knee Ex 5 5    REFLEXES: R L  Biceps  2 2                     Triceps  2 2  Patellar  2 2     SENSORY:   In both upper and lower extremities, sensation was intact to light touch.    COORDINATION:   In both upper extremities, finger-nose-finger was intact without dysmetria or overshoot.     GAIT:   Station was stable with a normal base. Gait was stable with a normal arm swing and speed. No ataxia, shuffling, steppage or waddling was present. No circumduction was present. No Romberg sign was present.    Physical Exam    Results  No visits with results within 2 Month(s) from this visit.   Latest known visit with results is:   Lab on 05/04/2023   Component Date Value   • WBC 05/04/2023 8.6    • nRBC 05/04/2023 0.0    • RBC 05/04/2023 5.66    • Hemoglobin 05/04/2023 15.8    • Hematocrit 05/04/2023 48.0    • MCV 05/04/2023 85    • MCHC 05/04/2023 32.9    • Platelets 05/04/2023 261    • RDW 05/04/2023 13.2    • Glucose 05/04/2023 121 (H)    • Sodium 05/04/2023 141    • Potassium 05/04/2023 4.3    • Chloride 05/04/2023 101    •  Bicarbonate 05/04/2023 30    • Anion Gap 05/04/2023 14    • Urea Nitrogen 05/04/2023 11    • Creatinine 05/04/2023 1.17    • GFR MALE 05/04/2023 67    • Calcium 05/04/2023 10.4    • Albumin 05/04/2023 4.9    • Alkaline Phosphatase 05/04/2023 71    • Total Protein 05/04/2023 7.2    • AST 05/04/2023 19    • Total Bilirubin 05/04/2023 0.7    • ALT (SGPT) 05/04/2023 18    • Hemoglobin A1C 05/04/2023 5.8 (A)    • Estimated Average Glucose 05/04/2023 120    • Cholesterol 05/04/2023 213 (H)    • HDL 05/04/2023 51.3    • Cholesterol/HDL Ratio 05/04/2023 4.2    • LDL 05/04/2023 139 (H)    • VLDL 05/04/2023 23    • Triglycerides 05/04/2023 114    • Vitamin B-12 05/04/2023 795    • TSH 05/04/2023 2.56        CT Head without IV Contrast: No results found for this or any previous visit from the past 365 days.     MR Brain with and without IV Contrast: No results found for this or any previous visit from the past 365 days.    MR Angio Head with and without IV Contrast: No results found for this or any previous visit from the past 365 days.    Assessment/Plan     Chavo Jordan is a 68yo male with PMHx of migraines, OA, LENORE, HLD, anxiety, presenting to establish care for migraines. Given the frequency and description of headaches, Chavo likely continues to be experiencing migraines. It appears the migraines are cervicogenic, with multiple starting in the neck, occur in the morning with neck pain upon awakening, and improvement with stretching. No red flag symptoms to indicate imaging at this time. It appears LENORE has returned as pt does not feel refreshed from sleep and is starting to snore again.    Plan:  -Per our discussion, we will start Robaxin for 2 weeks qhs for headache prevention.   -In addition, we will start Nurtec every other day for headache prevention  -C/w sumatriptan and indomethacin for acute headache treatment.    -Sleep referral to assess for LENORE and need for CPAP/BiPap  -Please keep a headache diary for each  headache to better characterize these events, use migraine fallon kaya to help track migraines  -Can consider nerve block in the future if these interventions do not improve symptoms    I personally spent 60 minutes today, exclusive of procedures, providing care for this patient, including preparation, face to face time, documentation and other services such as review of medical records, diagnostic result, patient education, counseling, coordination of care as specified in the encounter.     Seen and discussed with attending Dr. Neal,    Arya Jaimes, DO  PGY-2 Neurology

## 2024-07-10 DIAGNOSIS — G43.711 CHRONIC MIGRAINE WITHOUT AURA, WITH INTRACTABLE MIGRAINE, SO STATED, WITH STATUS MIGRAINOSUS: ICD-10-CM

## 2024-07-11 ENCOUNTER — OFFICE VISIT (OUTPATIENT)
Dept: SLEEP MEDICINE | Facility: CLINIC | Age: 69
End: 2024-07-11
Payer: MEDICARE

## 2024-07-11 VITALS
OXYGEN SATURATION: 100 % | SYSTOLIC BLOOD PRESSURE: 132 MMHG | HEIGHT: 73 IN | HEART RATE: 59 BPM | WEIGHT: 187 LBS | DIASTOLIC BLOOD PRESSURE: 77 MMHG | BODY MASS INDEX: 24.78 KG/M2

## 2024-07-11 DIAGNOSIS — G47.33 OSA (OBSTRUCTIVE SLEEP APNEA): ICD-10-CM

## 2024-07-11 PROCEDURE — 3075F SYST BP GE 130 - 139MM HG: CPT | Performed by: NURSE PRACTITIONER

## 2024-07-11 PROCEDURE — 1159F MED LIST DOCD IN RCRD: CPT | Performed by: NURSE PRACTITIONER

## 2024-07-11 PROCEDURE — 1160F RVW MEDS BY RX/DR IN RCRD: CPT | Performed by: NURSE PRACTITIONER

## 2024-07-11 PROCEDURE — G2211 COMPLEX E/M VISIT ADD ON: HCPCS | Performed by: NURSE PRACTITIONER

## 2024-07-11 PROCEDURE — 3078F DIAST BP <80 MM HG: CPT | Performed by: NURSE PRACTITIONER

## 2024-07-11 PROCEDURE — 1126F AMNT PAIN NOTED NONE PRSNT: CPT | Performed by: NURSE PRACTITIONER

## 2024-07-11 PROCEDURE — 99204 OFFICE O/P NEW MOD 45 MIN: CPT | Performed by: NURSE PRACTITIONER

## 2024-07-11 PROCEDURE — 1036F TOBACCO NON-USER: CPT | Performed by: NURSE PRACTITIONER

## 2024-07-11 PROCEDURE — 99214 OFFICE O/P EST MOD 30 MIN: CPT | Performed by: NURSE PRACTITIONER

## 2024-07-11 RX ORDER — INDOMETHACIN 25 MG/1
25 CAPSULE ORAL
COMMUNITY

## 2024-07-11 ASSESSMENT — SLEEP AND FATIGUE QUESTIONNAIRES
SLEEP_PROBLEM_INTERFERES_DAILY_ACTIVITIES: A LITTLE
SITING INACTIVE IN A PUBLIC PLACE LIKE A CLASS ROOM OR A MOVIE THEATER: WOULD NEVER DOZE
DIFFICULTY_FALLING_ASLEEP: MODERATE
WORRIED_DISTRESSED_DUE_TO_SLEEP: NOT AT ALL NOTICEABLE
HOW LIKELY ARE YOU TO NOD OFF OR FALL ASLEEP WHEN YOU ARE A PASSENGER IN A CAR FOR AN HOUR WITHOUT A BREAK: WOULD NEVER DOZE
ESS-CHAD TOTAL SCORE: 3
HOW LIKELY ARE YOU TO NOD OFF OR FALL ASLEEP IN A CAR, WHILE STOPPED FOR A FEW MINUTES IN TRAFFIC: WOULD NEVER DOZE
HOW LIKELY ARE YOU TO NOD OFF OR FALL ASLEEP WHILE SITTING AND TALKING TO SOMEONE: WOULD NEVER DOZE
HOW LIKELY ARE YOU TO NOD OFF OR FALL ASLEEP WHILE LYING DOWN TO REST IN THE AFTERNOON WHEN CIRCUMSTANCES PERMIT: HIGH CHANCE OF DOZING
HOW LIKELY ARE YOU TO NOD OFF OR FALL ASLEEP WHILE SITTING QUIETLY AFTER LUNCH WITHOUT ALCOHOL: WOULD NEVER DOZE
SATISFACTION_WITH_CURRENT_SLEEP_PATTERN: SATISFIED
HOW LIKELY ARE YOU TO NOD OFF OR FALL ASLEEP WHILE SITTING AND READING: WOULD NEVER DOZE
SLEEP_PROBLEM_NOTICEABLE_TO_OTHERS: NOT AT ALL NOTICEABLE
HOW LIKELY ARE YOU TO NOD OFF OR FALL ASLEEP WHILE WATCHING TV: WOULD NEVER DOZE

## 2024-07-11 ASSESSMENT — ENCOUNTER SYMPTOMS
DEPRESSION: 0
LOSS OF SENSATION IN FEET: 0
OCCASIONAL FEELINGS OF UNSTEADINESS: 0

## 2024-07-11 ASSESSMENT — COLUMBIA-SUICIDE SEVERITY RATING SCALE - C-SSRS
6. HAVE YOU EVER DONE ANYTHING, STARTED TO DO ANYTHING, OR PREPARED TO DO ANYTHING TO END YOUR LIFE?: NO
2. HAVE YOU ACTUALLY HAD ANY THOUGHTS OF KILLING YOURSELF?: NO
1. IN THE PAST MONTH, HAVE YOU WISHED YOU WERE DEAD OR WISHED YOU COULD GO TO SLEEP AND NOT WAKE UP?: NO

## 2024-07-11 ASSESSMENT — PAIN SCALES - GENERAL: PAINLEVEL: 0-NO PAIN

## 2024-07-11 NOTE — PATIENT INSTRUCTIONS
Repeat testing with low threshold to change to bilevel PAP  Lisa will call to schedule  Will plan for new equipment pending results--   Will call with results        [facility] Sleep Medicine  [facility] [address]  [address] [address]       NAME: Chavo Jordan   DATE:  07/11/24    DIAGNOSIS:   1. LENORE (obstructive sleep apnea)  Referral to Adult Sleep Medicine          Thank you for coming to the Sleep Medicine Clinic today! Your sleep medicine provider today was: JUAN Ferreira Below is a summary of your treatment plan, other important information, and our contact numbers:    TREATMENT PLAN:   - Follow-up in 2-3 months.  - If not already done, sign up for 'My Chart' and send prescription requests or messages through this      Scheduling a Sleep Study    Call the  Sleep Testing Center to speak with a sleep testing  to book your overnight sleep study procedure at one of our adult and pediatric-friendly sleep labs. Overnight sleep studies may be scheduled on a weekday or weekend.    We have child life services on a case by case basis at the [facility] location. We also perform daytime testing for shift workers on a case by case basis.    Locations for sleep studies are: [facility], [facility] ([facility]), [facility], [place], [facility], [place], [place].    Bring your usual medications and nightly routine items for your sleep study. In order to fall asleep faster in sleep lab, we advise patients to wake up earlier on the morning of the scheduled testing and avoid napping prior to testing. Sometimes, your provider may prescribe a sleep aid to be taken at lights out in the sleep lab. If you are taking a sleep aid, please have somebody pick you up after the sleep testing.    Results of your sleep study will be given to the ordering clinician. Please contact their office for results or follow up  as directed by your clinician.    For additional information about the sleep medicine services, please call [phone]       Instructions - Common LENORE Recs: - For your sleep apnea, continue to use your PAP every night and use it whenever you are sleeping.   - Avoid alcohol or sedatives several hours prior to sleeping.   - Get additional supplies for your PAP (e.g., mask, hose, filters) every 3 months or as your insurance allows from your [employer] company. Replacement cushions for your PAP mask can be requested monthly if airseals are an issue.  - Remember to clean your mask, tubings, and water chamber regularly as instructed.  - Avoid driving or operating heavy machinery when drowsy. A person driving while sleepy is five (5) times more likely to have an accident. If you feel sleepy, pull over and take a short power nap (sleep for less than 30 minutes). Otherwise, ask somebody to drive you.    EASY WAYS TO IMPROVE YOUR SLEEP:  1. Go to bed and wake up at the same time every day.   Aim for 8 hours but some people need less, some need more.   Get out of bed if you are not sleeping.   Limit naps to 20 min or less.   2. Expose yourself to daylight and/ or bright light in the morning.   Go outside or spend time near a window each morning.   You can use a light box (found on Amazon) if you wake before the sunrise.   Limit light exposure in the evenings (including electronic usage).   Try meditation, reading, stretching, deep breathing, warm shower or bath, or yoga nidra as part of your bedtime routine. There are many great FREE, videos or audio tracks on YouTube/ PinBridge, etc for guidance.  3. Exercise, in some form, EVERY day, but not too close to bedtime. Consider making this part of your routine at the start of your day, followed by a cool shower.  4. Eat meals at roughly the same time every day. Make sure you are prioritizing fruits, vegetables, whole grains, lean proteins.  5. Time your caffeine intake. Make sure you are  not drinking caffeine within 8 to 12 hours prior to your bedtime.   6. Avoid marijuana, alcohol, and nicotine. They will reduce sleep quality in any quantity.  7. Learn to manage anxiety. Psychology services at  can be reached at [phone] to schedule an appointment.     IMPORTANT INFORMATION:     Call 911 for medical emergencies.  Our offices are generally open from Monday-Friday, 9 am - 5 pm.  If you need to get in touch with me, you may either call me and my team(number is below) or you can use [employer].  If a referral for a test, for CPAP, or for another specialist was made, and you have not heard about scheduling this within a week, please call scheduling at [phone] ([phone]).  If you are unable to make your appointment for clinic or an overnight study, kindly call the office at least 48 hours in advance to cancel and reschedule.  If you are on CPAP, please bring your device's card to each clinic appointment unless told otherwise by your provider.  There are no supporting services by either the sleep doctors or their staff on weekends and Holidays, or after 5 PM on weekdays.   If you have been asked to come to a sleep study, make sure you bring toiletries, a comfy pillow, and any nighttime medications that you may regularly take. Also be sure to eat dinner before you arrive. We generally do not provide meals.      PRESCRIPTIONS:  We require 7 days advanced notice for prescription refills. If we do not receive the request in this time, we cannot guarantee that your medication will be refilled in time. Please contact the sleep nurses listed below for refills or request via [employer].     IMPORTANT PHONE NUMBERS:   Sleep Medicine Clinic Fax: [phone]  Appointments (for Pediatric Sleep Clinic): [phone] - option 1  Appointments (for Adult Sleep Clinic): [phone] - option 2  Appointments (For Sleep Studies): [phone] - option 3  Behavioral Sleep Medicine: [phone]  Sleep  Surgery: [phone]  ENT (Otolaryngology): [phone]  Headache Clinic (Neurology): [phone]  Neurology: [phone]  Psychiatry: [phone]  Pulmonary Function Testing (PFT) Center: [phone]  Pulmonary Medicine: [phone]  [employer] (DME): [phone]  [employer] (DME): [phone]  [facility] (DME): [phone]    Our Adult Sleep Medicine Team (Please do not hesitate to call the office or sleep nurse with any questions between appointments):    Adult Sleep Nurses (Gala Parker, CASTRO and Liberty Sotelo RN):  For clinical questions and refilling prescriptions: [phone]  Email sleep diaries and other documents at: [email or web address]    Adult Sleep Medicine Secretaries:  Amanda Collins (For Tereso/Martinez/Krsudarshan/Rosa/Yemichelle):   P: [phone]  F: [phone]  Johana Saxena (For Sue/Jimboler): P: [phone]  Fax: [phone]  Nuria Carrillo (For Laurie/Blank): P: [phone]  F: [phone]  Ana Laura Villegas (For Pancho): P: [phone]  F: [phone]  Nicole Shannon (For Cecy/Prakash/Zajeffrey): P: [phone]  F: [phone]  Tiffanie Younger (For Aniket/David): P: [phone]  F: [phone]     Adult Sleep Medicine Advanced Practice Providers:  Cortez Bermudez ([place], [place])  Aziza Randall ([facility])  Mone Key CNP ([facility], [facility])  Guillermina Palacio CNP ([place], [place], [facility])  Raven Deleon ([facility], [facility])  Clement Hernandez CNP ([facility])      Our Sleep Testing Center (ST[facility]) Locations:  Our team will contact you to schedule your sleep study, however, you can contact us as follow:  Main Phone Line (scheduling only): [phone]), option 3  Adult and Pediatric Locations  [facility] (6 years and older): [facility] - 4th floor ([address] [place] [address]) After hours line: [phone]  [facility]  "[place] (Main campus: All ages): [facility], 6th floor. After hours line: [phone]   [place] (5 years and older; younger considered on case-by-case basis): [address]; [address], [address]. Scheduling  After hours line: [phone]   [place] (6 years and older): [address]; [address]; [address]   [place] (6 years and older): [address] [facility]  After hours line: [phone]   [place] (13 years and older) in [place]: [address], 2nd floor  After hours line: [phone]   [place] (13 year and older): [address]  After hours line: [phone] (Home studies out of [facility])    Adult Only Locations:   [place] (18 years and older): [address], 2nd floor   [place] (18 years and older): [address]; 4th floor  After hours line: [phone]   [place] (18 years and older) at [place]: [address]  After hours line: [phone]        CONTACTING YOUR SLEEP MEDICINE PROVIDER:  Send a message directly to your provider through \"My Chart\", which is the email service through your  Records Account: [email or web address]   Call [phone] and leave a message. One of the administrative assistants will forward the message to your sleep medicine provider through \"My Chart\" and/or email.     Your sleep medicine provider for this visit was: Mone Key, JOSE ANGEL-CNP    In the event that you are running more than 15 minutes late to your appointment, I will kindly ask you to reschedule.       "

## 2024-07-11 NOTE — ASSESSMENT & PLAN NOTE
Remote diagnosis with subsequent weight loss  Reviewed [facility] documentation from Care Everywhere ~ 2001 and ~2008. Also reviewed Dr. Francois documentation from ~ 2018.    Will repeat sleep study with in lab split. Low threshold to change to bilevel due to previous CPAP intolerance. Pt agreeable  Plan to set up with equipment pending results. Will call as he does not actively use [employer].  RTC in Nov for compliance.

## 2024-07-11 NOTE — PROGRESS NOTES
Patient: Chavo Jordan Sr.    [id number]  : 1955 -- AGE 69 y.o.    Provider: JUAN Ferreira     Location [facility]   Service Date: 2024              [facility]  New Visit Note    HISTORY OF PRESENT ILLNESS     The patient's referring provider is: Justice Neal MD    HISTORY OF PRESENT ILLNESS   Chavo Jordan Sr. is a 69 y.o. male who presents to a [facility] for a sleep medicine evaluation with concerns of suspected LENORE + AM headaches. He previously saw Dr. Francois. Notes he goes to [place] in the winter after the holidays.     The patient has h/o HTN, hyperlipidemia, allergic rhinitis, vitamin B12 and D deficiency, GERD, hernia, anxiety, depression, alcohol use, cognitive decline, TIA, reactive airway disease, asthma, LENORE, periodic limb movements,     Past Sleep History  Patient has had PSG in 10/10/01 showing mild sleep apnea with an AHI of 10.52, and SpO2 gabriela below 90% at [facility]. He was recommended PAP titration study which he completed in  with recommendation for CPAP at 5 cwp. Was set up with CPAP at that time but did not use consistently.   Reassessment was completed in  with PSG showing severe sleep apnea with an AHI of 60.9 in supine sleep, AHI of 18.4 in non supine sleep at [facility]. CPAP was titrated to no optimal pressure, but CPAP of 7 cwp was recommended. (Wt 225 lbs)  Reassessment was completed again in  with a home sleep study via [employer] showing no significant sleep apnea with an AHI of 1.2 and SpO2 gabriela of 91%. (BMI 24.4)    Current History    On today's visit, the patient reports remote history of LENORE diagnosis. He notes he wore bilevel PAP as he could not tolerate CPAP. Wore for several years prior to losing 65 lbs and LENORE resolved. Tolerating equipment well. Felt he slept soundly. Didn't feel as fatigued during the day. Notes he is starting to  have memory concerns, attributes to LENORE. Feels he may have continued to have it despite home testing in 2018 showing otherwise. Reports yelling in last month in his sleep, lost of tossing and turning, pulls off fitting sheet every morning. Wakes with headaches often. Notes he always tends to have one but gets migraines and feels nauseated when they are really bad.   Denies RLS symptoms    Sleep schedule  on weekdays / work days:  Usual Bedtime:    10 pm  Falls asleep around:  11 pm  # of awakenings: tosses and turns  Wake time:  8 am    Naps: sometimes    Preferred sleeping position: side, stomach    Sleep-related ROS:    Problems going to sleep: No problems going to sleep    Problems staying asleep Problems Staying Asleep: bed partner and breathing problems    Breathing during sleep: snoring, snorting during sleep, and witnessed apneas    Daytime Symptoms  On awakening patient reports: morning headaches    RLS screen:  RLSSCREEN: - Sensations: Patient does not have unusual sensations in their extremities that cause an urge to move them     Sleep-related behaviors: see above    Fatigue: symptoms bothersome, but easily able to carry out all usual work/school/family activities    ESS: 3   NATALIE: 5  FOSQ:  incomplete      REVIEW OF SYSTEMS     REVIEW OF SYSTEMS  Review of Systems   All other systems reviewed and are negative.        ALLERGIES AND MEDICATIONS     ALLERGIES  Allergies   Allergen Reactions    Penicillins Other and Hives       MEDICATIONS  Current Outpatient Medications   Medication Sig Dispense Refill    aspirin 81 mg EC tablet Take 1 tablet (81 mg) by mouth once daily.      busPIRone (Buspar) 15 mg tablet Take 1 tablet (15 mg) by mouth 2 times a day. (Patient taking differently: Take 1 tablet (15 mg) by mouth once daily.) 60 tablet 2    esomeprazole (NexIUM) 20 mg DR capsule Take 1 capsule (20 mg) by mouth 2 times a day.      garlic tablet Take 1 tablet by mouth once daily.      indomethacin (Indocin) 25  mg capsule Take 1 capsule (25 mg) by mouth 2 times daily (morning and late afternoon).      multivitamin tablet Take 1 tablet by mouth once daily.      omega-3 fatty acids 500 mg capsule Take 1 capsule (500 mg) by mouth 2 times a day.      rimegepant (NURTEC) 75 mg tablet,disintegrating Take 1 tablet (75 mg) by mouth every other day for 48 doses. 16 tablet 2    SUMAtriptan (Imitrex) 100 mg tablet Take 1 tablet (100 mg) by mouth 1 time if needed for migraine. Take at onset of migraine headache. May repeat in 2 hours if needed. 9 tablet 3    turmeric root extract 500 mg capsule Take 1 capsule by mouth 2 times a day.      methocarbamol (Robaxin) 500 mg tablet Take 1 tablet (500 mg) by mouth 3 times a day for 5 days. (Patient not taking: Reported on 7/11/2024) 15 tablet 0     No current facility-administered medications for this visit.         PAST HISTORY     PAST MEDICAL HISTORY  Past Medical History:   Diagnosis Date    Cervicalgia     Neck pain    Cervicalgia     Neck pain    Chronic rhinitis 06/21/2019    Chronic rhinitis    Migraine, unspecified, not intractable, without status migrainosus 10/29/2019    Migraine    Obstructive sleep apnea (adult) (pediatric) 11/21/2018    LENORE (obstructive sleep apnea)    Other intervertebral disc degeneration, lumbar region     DDD (degenerative disc disease), lumbar    Personal history of other diseases of the circulatory system     Personal history of coronary atherosclerosis    Personal history of other diseases of the digestive system     History of gastroesophageal reflux (GERD)    Personal history of other diseases of the musculoskeletal system and connective tissue     History of osteoarthritis    Personal history of other diseases of the nervous system and sense organs 09/06/2018    History of hearing loss    Personal history of other diseases of the nervous system and sense organs 05/18/2015    History of migraine    Personal history of other diseases of the nervous  "system and sense organs 03/16/2015    History of eustachian tube dysfunction    Personal history of other diseases of the respiratory system 03/30/2015    History of upper respiratory infection    Personal history of other endocrine, nutritional and metabolic disease     History of hypercholesterolemia    Personal history of other specified conditions     History of syncope    Personal history of other specified conditions 12/05/2016    History of persistent cough    Transient global amnesia     Transient global amnesia       PAST SURGICAL HISTORY:  Past Surgical History:   Procedure Laterality Date    APPENDECTOMY  03/03/2014    Appendectomy    OTHER SURGICAL HISTORY  09/06/2018    Anterior Gastropexy For Hiatal Hernia    OTHER SURGICAL HISTORY  03/03/2014    Distal Reinsertion Of Ruptured Biceps Tendon    VASECTOMY  02/25/2014    Surgery Vas Deferens Vasectomy       FAMILY HISTORY  No family history on file.    DOES/DOES NOT EC: does have a family history of sleep disorder. Brother has LENORE but does not treat it       SOCIAL HISTORY  He  reports that he has quit smoking. His smoking use included cigarettes. He has never used smokeless tobacco. He reports that he does not drink alcohol and does not use drugs. He currently lives with his wife.     Caffeine consumption: 5x cups per day  Alcohol consumption: past  Smoking: past- quit in 1989  Marijuana: none     PHYSICAL EXAM     VITAL SIGNS: /77 (BP Location: Left arm, Patient Position: Sitting, BP Cuff Size: Large adult)   Pulse 59   Ht 1.854 m (6' 1\")   Wt 84.8 kg (187 lb)   SpO2 100%   BMI 24.67 kg/m²      PREVIOUS WEIGHTS:  Wt Readings from Last 3 Encounters:   07/11/24 84.8 kg (187 lb)   07/09/24 85.7 kg (189 lb)   08/04/23 86.1 kg (189 lb 12.8 oz)       Physical Exam  Physical Exam   Constitutional: Alert and oriented, cooperative, no obvious distress   HEENT: Non icteric or anemic, EOM WNL bilaterally     Upper Airway Examination:   Modified " Mallampati Class: 1  OP Lateral wall narrowing stgstrstastdstest:st st1st Narrow A-P diameter  Tonsil ndGndrndanddndend:nd nd2nd No high arched palate  Tongue Scalloping: Y  Retrognathia: N  Overjet: N    Neck: Supple, no JVD, no goiter, no adenopathy  Chest: CTA bilaterally, no wheezing, crackles, rubs   Cardiac: RRR, S1 and S2, no murmur, rub, thrill   Abdomen: Soft, nontender, no masses, no organomegaly   Extremities: No clubbing, no LL edema   Neuromuscular: Cranial nerves grossly intact, no focal deficits        RESULTS/DATA     Bicarbonate (mmol/L)   Date Value   05/04/2023 30   04/29/2022 29   05/29/2020 31       No results found for this or any previous visit from the past 365 days.       Failed to redirect to the Timeline version of the [employer] SmartLink.        ASSESSMENT/PLAN     Mr. Jordan is a 69 y.o. male and He was referred to the [facility] for evaluation of LENORE    Problem List and Orders    LENORE  Remote diagnosis with subsequent weight loss  Reviewed [facility] documentation from Care Everywhere ~ 2001 and ~2008. Also reviewed Dr. Francois documentation from ~ 2018.     Will repeat sleep study with in lab split. Low threshold to change to bilevel due to previous CPAP intolerance. Pt agreeable  Plan to set up with equipment pending results. Will call as he does not actively use MyChart.  RTC in Nov for compliance.

## 2024-07-31 DIAGNOSIS — F32.A ANXIETY AND DEPRESSION: ICD-10-CM

## 2024-07-31 DIAGNOSIS — F41.9 ANXIETY AND DEPRESSION: ICD-10-CM

## 2024-07-31 RX ORDER — BUSPIRONE HYDROCHLORIDE 15 MG/1
15 TABLET ORAL 2 TIMES DAILY
Qty: 60 TABLET | Refills: 2 | Status: SHIPPED | OUTPATIENT
Start: 2024-07-31 | End: 2025-07-31

## 2024-08-30 ENCOUNTER — PROCEDURE VISIT (OUTPATIENT)
Dept: SLEEP MEDICINE | Facility: CLINIC | Age: 69
End: 2024-08-30
Payer: MEDICARE

## 2024-08-30 DIAGNOSIS — G47.33 OSA (OBSTRUCTIVE SLEEP APNEA): ICD-10-CM

## 2024-08-31 VITALS
BODY MASS INDEX: 24.57 KG/M2 | HEIGHT: 73 IN | DIASTOLIC BLOOD PRESSURE: 74 MMHG | SYSTOLIC BLOOD PRESSURE: 134 MMHG | WEIGHT: 185.41 LBS

## 2024-08-31 ASSESSMENT — SLEEP AND FATIGUE QUESTIONNAIRES
HOW LIKELY ARE YOU TO NOD OFF OR FALL ASLEEP WHILE WATCHING TV: WOULD NEVER DOZE
HOW LIKELY ARE YOU TO NOD OFF OR FALL ASLEEP IN A CAR, WHILE STOPPED FOR A FEW MINUTES IN TRAFFIC: WOULD NEVER DOZE
SITING INACTIVE IN A PUBLIC PLACE LIKE A CLASS ROOM OR A MOVIE THEATER: WOULD NEVER DOZE
HOW LIKELY ARE YOU TO NOD OFF OR FALL ASLEEP WHILE LYING DOWN TO REST IN THE AFTERNOON WHEN CIRCUMSTANCES PERMIT: HIGH CHANCE OF DOZING
HOW LIKELY ARE YOU TO NOD OFF OR FALL ASLEEP WHILE SITTING QUIETLY AFTER LUNCH WITHOUT ALCOHOL: WOULD NEVER DOZE
HOW LIKELY ARE YOU TO NOD OFF OR FALL ASLEEP WHILE SITTING AND TALKING TO SOMEONE: WOULD NEVER DOZE
HOW LIKELY ARE YOU TO NOD OFF OR FALL ASLEEP WHEN YOU ARE A PASSENGER IN A CAR FOR AN HOUR WITHOUT A BREAK: WOULD NEVER DOZE
ESS-CHAD TOTAL SCORE: 3
HOW LIKELY ARE YOU TO NOD OFF OR FALL ASLEEP WHILE SITTING AND READING: WOULD NEVER DOZE

## 2024-08-31 NOTE — PROGRESS NOTES
[facility] TECH NOTE:     Patient: Chavo Jordan .   MRN//AGE: [id number]  1955  69 y.o.   Technologist: Nelda Hargrove   Room: 1   Service Date: 2024        Sleep Testing Location: [facility]: 3    TECHNOLOGIST SLEEP STUDY PROCEDURE NOTE:   This sleep study is being conducted according to the policies and procedures outlined by the AAS accreditation standards.  The sleep study procedure and processes involved during this appointment was explained to the patient/patient’s family, questions were answered. The patient/family verbalized understanding.      The patient is a 69 y.o. year old male scheduled for a Diagnostic PSG Split night with montage of:  Diagnostic PSG Split night . he arrived for his appointment.      The study that was ultimately completed was a Diagnostic PSG  with montage of:  Diagnostic PSG .    The full study Was completed.  Patient questionnaires completed?: yes     Consents signed? yes    Initial Fall Risk Screening:     Chavo has not fallen in the last 6 months. his did not result in injury. Chavo does not have a fear of falling. He does not need assistance with sitting, standing, or walking. he does not need assistance walking in his home. he does not need assistance in an unfamiliar setting. The patient is notusing an assistive device.     Brief Study observations: All sensors applied.  Patient did not qualify for ordered split.  Full study completed.    Deviation to order/protocol and reason: None      Other:None    After the procedure, the patient/family was informed to ensure followup with ordering clinician for testing results.      Technologist: TONYA Purdy

## 2024-09-10 ENCOUNTER — TELEPHONE (OUTPATIENT)
Dept: SLEEP MEDICINE | Facility: HOSPITAL | Age: 69
End: 2024-09-10
Payer: MEDICARE

## 2024-09-10 DIAGNOSIS — E83.10 DISORDER OF IRON METABOLISM: ICD-10-CM

## 2024-09-10 DIAGNOSIS — G47.61 PLMD (PERIODIC LIMB MOVEMENT DISORDER): Primary | ICD-10-CM

## 2024-09-10 NOTE — TELEPHONE ENCOUNTER
Called pt regarding sleep study notes, told pt provider ordered lab work and found close outpt lab to his residence. All questions answered.

## 2024-09-11 ENCOUNTER — LAB (OUTPATIENT)
Dept: LAB | Facility: LAB | Age: 69
End: 2024-09-11
Payer: MEDICARE

## 2024-09-11 DIAGNOSIS — G47.61 PLMD (PERIODIC LIMB MOVEMENT DISORDER): ICD-10-CM

## 2024-09-11 DIAGNOSIS — E83.10 DISORDER OF IRON METABOLISM: ICD-10-CM

## 2024-09-11 LAB
FERRITIN SERPL-MCNC: 83 NG/ML (ref 20–300)
IRON SATN MFR SERPL: 25 % (ref 25–45)
IRON SERPL-MCNC: 86 UG/DL (ref 35–150)
TIBC SERPL-MCNC: 340 UG/DL (ref 240–445)
UIBC SERPL-MCNC: 254 UG/DL (ref 110–370)

## 2024-09-11 PROCEDURE — 83550 IRON BINDING TEST: CPT

## 2024-09-11 PROCEDURE — 82728 ASSAY OF FERRITIN: CPT

## 2024-09-11 PROCEDURE — 36415 COLL VENOUS BLD VENIPUNCTURE: CPT

## 2024-09-11 PROCEDURE — 83540 ASSAY OF IRON: CPT

## 2024-09-12 ENCOUNTER — TELEPHONE (OUTPATIENT)
Dept: SLEEP MEDICINE | Facility: HOSPITAL | Age: 69
End: 2024-09-12
Payer: MEDICARE

## 2024-09-12 DIAGNOSIS — G47.61 PLMD (PERIODIC LIMB MOVEMENT DISORDER): Primary | ICD-10-CM

## 2024-09-12 RX ORDER — GABAPENTIN 100 MG/1
100 CAPSULE ORAL NIGHTLY
Qty: 30 CAPSULE | Refills: 0 | Status: SHIPPED | OUTPATIENT
Start: 2024-09-12 | End: 2024-10-12

## 2024-09-12 NOTE — TELEPHONE ENCOUNTER
Gave results to pt, educated on rx of gabapentin, he will contact us after a couple of weeks to give an update.

## 2024-10-14 DIAGNOSIS — G47.61 PLMD (PERIODIC LIMB MOVEMENT DISORDER): ICD-10-CM

## 2024-10-15 RX ORDER — GABAPENTIN 100 MG/1
CAPSULE ORAL
Qty: 30 CAPSULE | Refills: 0 | Status: SHIPPED | OUTPATIENT
Start: 2024-10-15

## 2024-10-21 ENCOUNTER — APPOINTMENT (OUTPATIENT)
Dept: PRIMARY CARE | Facility: CLINIC | Age: 69
End: 2024-10-21
Payer: MEDICARE

## 2024-10-21 VITALS
RESPIRATION RATE: 15 BRPM | HEART RATE: 72 BPM | WEIGHT: 190.4 LBS | OXYGEN SATURATION: 99 % | HEIGHT: 74 IN | BODY MASS INDEX: 24.43 KG/M2 | DIASTOLIC BLOOD PRESSURE: 62 MMHG | SYSTOLIC BLOOD PRESSURE: 123 MMHG

## 2024-10-21 DIAGNOSIS — Z87.19 HISTORY OF BARRETT ESOPHAGUS: Primary | ICD-10-CM

## 2024-10-21 DIAGNOSIS — F41.9 ANXIETY: ICD-10-CM

## 2024-10-21 DIAGNOSIS — E78.5 DYSLIPIDEMIA: ICD-10-CM

## 2024-10-21 DIAGNOSIS — Z00.00 ROUTINE GENERAL MEDICAL EXAMINATION AT A HEALTH CARE FACILITY: ICD-10-CM

## 2024-10-21 DIAGNOSIS — F10.21 ALCOHOLISM IN REMISSION (MULTI): ICD-10-CM

## 2024-10-21 PROCEDURE — 99214 OFFICE O/P EST MOD 30 MIN: CPT | Performed by: INTERNAL MEDICINE

## 2024-10-21 PROCEDURE — 3008F BODY MASS INDEX DOCD: CPT | Performed by: INTERNAL MEDICINE

## 2024-10-21 PROCEDURE — 3078F DIAST BP <80 MM HG: CPT | Performed by: INTERNAL MEDICINE

## 2024-10-21 PROCEDURE — 1159F MED LIST DOCD IN RCRD: CPT | Performed by: INTERNAL MEDICINE

## 2024-10-21 PROCEDURE — 1036F TOBACCO NON-USER: CPT | Performed by: INTERNAL MEDICINE

## 2024-10-21 PROCEDURE — 3074F SYST BP LT 130 MM HG: CPT | Performed by: INTERNAL MEDICINE

## 2024-10-21 RX ORDER — ONDANSETRON 4 MG/1
TABLET, FILM COATED ORAL
COMMUNITY
Start: 2022-11-15

## 2024-10-21 RX ORDER — TAMSULOSIN HYDROCHLORIDE 0.4 MG/1
1 CAPSULE ORAL
COMMUNITY
Start: 2024-09-03

## 2024-10-21 RX ORDER — ACETAMINOPHEN 500 MG
TABLET ORAL
COMMUNITY

## 2024-10-21 RX ORDER — SIMVASTATIN 40 MG/1
40 TABLET, FILM COATED ORAL NIGHTLY
Qty: 90 TABLET | Refills: 3 | Status: SHIPPED | OUTPATIENT
Start: 2024-10-21 | End: 2025-10-21

## 2024-10-21 ASSESSMENT — ENCOUNTER SYMPTOMS
HEADACHES: 1
DECREASED CONCENTRATION: 1
FATIGUE: 1
SLEEP DISTURBANCE: 1

## 2024-10-21 ASSESSMENT — PATIENT HEALTH QUESTIONNAIRE - PHQ9
1. LITTLE INTEREST OR PLEASURE IN DOING THINGS: NOT AT ALL
SUM OF ALL RESPONSES TO PHQ9 QUESTIONS 1 AND 2: 1
2. FEELING DOWN, DEPRESSED OR HOPELESS: NOT AT ALL
SUM OF ALL RESPONSES TO PHQ9 QUESTIONS 1 AND 2: 0
1. LITTLE INTEREST OR PLEASURE IN DOING THINGS: NOT AT ALL
2. FEELING DOWN, DEPRESSED OR HOPELESS: SEVERAL DAYS

## 2024-10-21 NOTE — ASSESSMENT & PLAN NOTE
-Pt stopped 40+ years ago. Still goes to AA meetings. Encouraged to keep doing what he has done thus far.

## 2024-10-21 NOTE — ASSESSMENT & PLAN NOTE
-Pt isn't sure if buspar is helping. Will wean himself off and follow up at next appt to see if it made a difference or not.

## 2024-10-21 NOTE — ASSESSMENT & PLAN NOTE
-Reviewed 10 year ASCVD risk of 17%. Pt used to be on statin; would rather be back on it then make dietary changes. Refilled simvastatin. Is already taking ASA and fish oil.

## 2024-10-21 NOTE — PROGRESS NOTES
"Subjective   Patient ID: Chavo Jordan Sr. is a 69 y.o. male who presents for Establish Care.    Here to get established from Dr. Jones's office.    PMH:  -Hernandez's esophagus: Last EGD done 2021 at [facility] per our records. Pt isn't sure why they couldn't do it earlier this year when his colonoscopy was done.  -Gastroparesis: Sees Dr. Grover at [facility].  -Migraines: Sees neurology at [facility]. When seen in July he was given 2 weeks of robaxin which didn't help. Was also told he should try nurtec and this was sent in but pt doesn't think he ever got the medication.  -LENORE: Diagnosed with this years ago and pt then lost 70 lbs which improved symptoms. Stopped wearing CPAP as a result. Sleep study repeated this year and was now negative. However, pt noted to have RLS during this time. Was tried on gapapentin afterwards and pt feels like he did sleep a little better with it.        Review of Systems   Constitutional:  Positive for fatigue.   Neurological:  Positive for headaches.   Psychiatric/Behavioral:  Positive for decreased concentration and sleep disturbance.        /62 (BP Location: Right arm, Patient Position: Sitting)   Pulse 72   Resp 15   Ht 1.803 m (5' 11\")   Wt 86.4 kg (190 lb 6.4 oz)   SpO2 99%   BMI 26.56 kg/m²   Objective   Physical Exam  Constitutional:       General: He is not in acute distress.     Appearance: He is not ill-appearing, toxic-appearing or diaphoretic.   HENT:      Head: Normocephalic and atraumatic.   Eyes:      Conjunctiva/sclera: Conjunctivae normal.   Cardiovascular:      Rate and Rhythm: Normal rate and regular rhythm.      Heart sounds: No murmur heard.     No friction rub. No gallop.   Pulmonary:      Effort: Pulmonary effort is normal. No respiratory distress.      Breath sounds: No stridor. No wheezing, rhonchi or rales.   Neurological:      Mental Status: He is alert.         Assessment/Plan   Problem List Items Addressed This Visit             ICD-10-CM    Alcoholism " in remission (Multi) F10.21     -Pt stopped 40+ years ago. Still goes to AA meetings. Encouraged to keep doing what he has done thus far.         Dyslipidemia E78.5     -Reviewed 10 year ASCVD risk of 17%. Pt used to be on statin; would rather be back on it then make dietary changes. Refilled simvastatin. Is already taking ASA and fish oil.         Relevant Medications    simvastatin (Zocor) 40 mg tablet    Other Relevant Orders    Comprehensive metabolic panel    CBC    Lipid panel    Anxiety F41.9     -Pt isn't sure if buspar is helping. Will wean himself off and follow up at next appt to see if it made a difference or not.          Other Visit Diagnoses         Codes    History of Hernandez esophagus    -  Primary Z87.19    Routine general medical examination at a health care facility     Z00.00    Relevant Orders    Hemoglobin A1c        -Pt isn't sure about Hernandez's esophagus follow up. Had EGD done 2021 at [facility]; records request done to see what needs to be done next.  -To return for MAW in 1 month. Will get labwork before that appt.  -Pt already has flu/COVID shots scheduled at pharmacy. Will get done there.         Preston Carter MD 10/21/24 2:53 PM

## 2024-10-23 ENCOUNTER — LAB (OUTPATIENT)
Dept: LAB | Facility: LAB | Age: 69
End: 2024-10-23
Payer: MEDICARE

## 2024-10-23 DIAGNOSIS — Z00.00 ROUTINE GENERAL MEDICAL EXAMINATION AT A HEALTH CARE FACILITY: ICD-10-CM

## 2024-10-23 DIAGNOSIS — E78.5 DYSLIPIDEMIA: ICD-10-CM

## 2024-10-23 LAB
ALBUMIN SERPL BCP-MCNC: 4.4 G/DL (ref 3.4–5)
ALP SERPL-CCNC: 54 U/L (ref 33–136)
ALT SERPL W P-5'-P-CCNC: 14 U/L (ref 10–52)
ANION GAP SERPL CALC-SCNC: 12 MMOL/L (ref 10–20)
AST SERPL W P-5'-P-CCNC: 17 U/L (ref 9–39)
BILIRUB SERPL-MCNC: 0.4 MG/DL (ref 0–1.2)
BUN SERPL-MCNC: 15 MG/DL (ref 6–23)
CALCIUM SERPL-MCNC: 9.4 MG/DL (ref 8.6–10.6)
CHLORIDE SERPL-SCNC: 105 MMOL/L (ref 98–107)
CHOLEST SERPL-MCNC: 189 MG/DL (ref 0–199)
CHOLESTEROL/HDL RATIO: 4.1
CO2 SERPL-SCNC: 29 MMOL/L (ref 21–32)
CREAT SERPL-MCNC: 1.12 MG/DL (ref 0.5–1.3)
EGFRCR SERPLBLD CKD-EPI 2021: 71 ML/MIN/1.73M*2
ERYTHROCYTE [DISTWIDTH] IN BLOOD BY AUTOMATED COUNT: 13.2 % (ref 11.5–14.5)
EST. AVERAGE GLUCOSE BLD GHB EST-MCNC: 120 MG/DL
GLUCOSE SERPL-MCNC: 100 MG/DL (ref 74–99)
HBA1C MFR BLD: 5.8 %
HCT VFR BLD AUTO: 46.1 % (ref 41–52)
HDLC SERPL-MCNC: 46.2 MG/DL
HGB BLD-MCNC: 15 G/DL (ref 13.5–17.5)
LDLC SERPL CALC-MCNC: 125 MG/DL
MCH RBC QN AUTO: 28.1 PG (ref 26–34)
MCHC RBC AUTO-ENTMCNC: 32.5 G/DL (ref 32–36)
MCV RBC AUTO: 87 FL (ref 80–100)
NON HDL CHOLESTEROL: 143 MG/DL (ref 0–149)
NRBC BLD-RTO: 0 /100 WBCS (ref 0–0)
PLATELET # BLD AUTO: 235 X10*3/UL (ref 150–450)
POTASSIUM SERPL-SCNC: 4.4 MMOL/L (ref 3.5–5.3)
PROT SERPL-MCNC: 6.6 G/DL (ref 6.4–8.2)
RBC # BLD AUTO: 5.33 X10*6/UL (ref 4.5–5.9)
SODIUM SERPL-SCNC: 142 MMOL/L (ref 136–145)
TRIGL SERPL-MCNC: 89 MG/DL (ref 0–149)
VLDL: 18 MG/DL (ref 0–40)
WBC # BLD AUTO: 6.1 X10*3/UL (ref 4.4–11.3)

## 2024-10-23 PROCEDURE — 80061 LIPID PANEL: CPT

## 2024-10-23 PROCEDURE — 85027 COMPLETE CBC AUTOMATED: CPT

## 2024-10-23 PROCEDURE — 36415 COLL VENOUS BLD VENIPUNCTURE: CPT

## 2024-10-23 PROCEDURE — 83036 HEMOGLOBIN GLYCOSYLATED A1C: CPT

## 2024-10-23 PROCEDURE — 80053 COMPREHEN METABOLIC PANEL: CPT

## 2024-10-24 ENCOUNTER — OFFICE VISIT (OUTPATIENT)
Dept: NEUROLOGY | Facility: HOSPITAL | Age: 69
End: 2024-10-24
Payer: MEDICARE

## 2024-10-24 VITALS
DIASTOLIC BLOOD PRESSURE: 80 MMHG | RESPIRATION RATE: 18 BRPM | TEMPERATURE: 97.2 F | SYSTOLIC BLOOD PRESSURE: 143 MMHG | HEART RATE: 66 BPM

## 2024-10-24 DIAGNOSIS — G43.009 MIGRAINE WITHOUT AURA AND WITHOUT STATUS MIGRAINOSUS, NOT INTRACTABLE: ICD-10-CM

## 2024-10-24 DIAGNOSIS — G43.711 CHRONIC MIGRAINE WITHOUT AURA, WITH INTRACTABLE MIGRAINE, SO STATED, WITH STATUS MIGRAINOSUS: Primary | ICD-10-CM

## 2024-10-24 PROCEDURE — 3077F SYST BP >= 140 MM HG: CPT | Performed by: PSYCHIATRY & NEUROLOGY

## 2024-10-24 PROCEDURE — 99214 OFFICE O/P EST MOD 30 MIN: CPT | Performed by: PSYCHIATRY & NEUROLOGY

## 2024-10-24 PROCEDURE — 1036F TOBACCO NON-USER: CPT | Performed by: PSYCHIATRY & NEUROLOGY

## 2024-10-24 PROCEDURE — 3079F DIAST BP 80-89 MM HG: CPT | Performed by: PSYCHIATRY & NEUROLOGY

## 2024-10-24 NOTE — PROGRESS NOTES
Chief Complaint: Jane Jordan Sr. is a 69 y.o. year old man who presents for a follow-up visit for headaches.     HPI     Chavo started getting headaches at age 8.  Headaches gradually worsening in frequency and severity over the course of 61 years. Generally, headaches last about  1-2  hours in duration, some last for 4 days, twice a month. Patient otherwise has 14/30 headache days per month. The headaches are usually pressure around the eye, can travel to the neck or back of the head. symmetric. The patient rates her most severe headaches a 4 in intensity, indomethacin knocks headaches to a 0-1. Takes Sumatriptan with indomethacin half the time so he doesn't run out of pills. Associated nausea, photophobia, and phonophobia. Headaches are worsened with exertion. Triggers include waking up in the morning, looking upwards for long periods of time, TV screens, hungry, bad smells.    Interval history:  He had sleep study on 8/30 and it showed mild LENORE, he has a follow-up appointment scheduled on 11/14 with sleep medicine.   He continues using sumatriptan, indomethacin and Excedrin.  He took sumatriptan 7 times in a month, indomethacin about 7 times and Excedrin 3-4 times a month.  He rode his bike for 30 miles yesterday.   The numbers of headaches have been the same but th intensity is variable and most of the times are between 3-5 on VAS.  He didn't try Nurtec for migraine prevention and I encouraged him to start it.  He tried Robaxin but it didn't help much.      Previous visit:    Pt was a patient of Dr. Cui's from 10/2019-4/2020. Here he received Botox treatment. Prior to this was seen at [facility] in the 90s. States he has seen multiple neurologists. Botox was not effective after 2 rounds. History of sleep apnea, states it went away after he lost weight, no longer wearing BiPap. Wife states pt is snoring again. Sleeping 7-8 hours, not restful sleep, not refreshed in the morning.     Pt wakes up with  a headache almost every morning. Stretching, hot showers, NSAIDs, excedrin, sumatriptan helps.     Chavo does not experience headache aura. States he used to in his 20s, but no longer experiencing. Associated symptoms including tinnitus. Denies any focal symptoms, including weakness, paresthesias, vision changes, dizziness, imbalance.     Work attendance or other daily activities are affected by the headaches.     Patient also c/f cognitive decline. Wife Maritza also concerned. Gets lost at stop signs, increasing forgetfulness. Sense of balance off, unable to fix things like he use to.  States he is taking donepezil as of 1 month ago, prescribed by PCP.     Current Acute Headache Treatment Indomethacin, Excedrin, sumatriptan (cuts in half),    Current Preventative Headache Treatment 4-5 cups coffee/day   Previous Acute Headache Treatment Sumatriptan oral and injection. Oral now  Maxalt  Ergotamine- effective but was off the market for a while.   Previous Preventative Headache Treatment topamax- Does not remember impact but no longer taking  Amitriptyline ineffective  Nortriptyline ineffective  Depakote ineffective  Propranolol ineffective  Emgality ineffective Caused headache and not covered well.   Cymbalta ineffective  Botox x2-3         ROS: As per HPI, otherwise all other systems have been reviewed are negative for complaint.      Family history: migraines with mom, son and daughter.      Medical History        Past Medical History:   Diagnosis Date    Cervicalgia       Neck pain    Cervicalgia       Neck pain    Chronic rhinitis 06/21/2019     Chronic rhinitis    Migraine, unspecified, not intractable, without status migrainosus 10/29/2019     Migraine    Obstructive sleep apnea (adult) (pediatric) 11/21/2018     LENORE (obstructive sleep apnea)    Other intervertebral disc degeneration, lumbar region       DDD (degenerative disc disease), lumbar    Personal history of other diseases of the circulatory system        Personal history of coronary atherosclerosis    Personal history of other diseases of the digestive system       History of gastroesophageal reflux (GERD)    Personal history of other diseases of the musculoskeletal system and connective tissue       History of osteoarthritis    Personal history of other diseases of the nervous system and sense organs 09/06/2018     History of hearing loss    Personal history of other diseases of the nervous system and sense organs 05/18/2015     History of migraine    Personal history of other diseases of the nervous system and sense organs 03/16/2015     History of eustachian tube dysfunction    Personal history of other diseases of the respiratory system 03/30/2015     History of upper respiratory infection    Personal history of other endocrine, nutritional and metabolic disease       History of hypercholesterolemia    Personal history of other specified conditions       History of syncope    Personal history of other specified conditions 12/05/2016     History of persistent cough    Transient global amnesia       Transient global amnesia         Surgical History         Past Surgical History:   Procedure Laterality Date    APPENDECTOMY   03/03/2014     Appendectomy    OTHER SURGICAL HISTORY   09/06/2018     Anterior Gastropexy For Hiatal Hernia    OTHER SURGICAL HISTORY   03/03/2014     Distal Reinsertion Of Ruptured Biceps Tendon    VASECTOMY   02/25/2014     Surgery Vas Deferens Vasectomy         Family History   No family history on file.     Social History            Tobacco Use    Smoking status: Former       Types: Cigarettes    Smokeless tobacco: Never   Substance Use Topics    Alcohol use: Never            Objective  There were no vitals taken for this visit.     Neuro Exam:  Cardiac Exam: No apparent edema of b/l lower extremities  Neurological Exam:  MENTAL STATUS:   General Appearance: No distress, alert, interactive, and cooperative. Orientation was normal to time,  place and person. Recent and remote memory was intact.      CRANIAL NERVES:   CN 2         Visual fields full to confrontation.   CN 3, 4, 6   Pupils round, 4 mm in diameter, equally reactive to light. Lids symmetric; no ptosis. EOMs normal alignment, full range with normal saccades, pursuit and convergence.   No nystagmus.   CN 5   Facial sensation intact bilaterally.   CN 7   Normal and symmetric facial strength. Nasolabial folds symmetric.   CN 8   Hearing intact to conversation and finger rub.  CN 9, 10   Palate elevates symmetrically.  CN 11   Normal strength of shoulder shrug and neck turning.   CN 12   Tongue midline, with normal bulk and strength; no fasciculations.      MOTOR:   Muscle bulk and tone were normal in both upper and lower extremities.   No pronator drift bilaterally.  No fasciculations, tremor or other abnormal movements evident with the patient examined clothed.     STRENGTH:     R           L  Deltoid            5          5  Biceps              5          5  Triceps             5          5     Hip rxnepsa64  Knee Flex55  Knee Ex55     REFLEXES:       R          L  Biceps              2          2                     Triceps             2          2  Patellar            2          2      SENSORY:   In both upper and lower extremities, sensation was intact to light touch.     COORDINATION:   In both upper extremities, finger-nose-finger was intact without dysmetria or overshoot.      GAIT:   Station was stable with a normal base. Gait was stable with a normal arm swing and speed. No ataxia, shuffling, steppage or waddling was present. No circumduction was present. No Romberg sign was present.     Physical Exam     Results        No visits with results within 2 Month(s) from this visit.   Latest known visit with results is:   Lab on 05/04/2023   Component Date Value    WBC 05/04/2023 8.6     nRBC 05/04/2023 0.0     RBC 05/04/2023 5.66     Hemoglobin 05/04/2023 15.8     Hematocrit 05/04/2023  48.0     MCV 05/04/2023 85     MCHC 05/04/2023 32.9     Platelets 05/04/2023 261     RDW 05/04/2023 13.2     Glucose 05/04/2023 121 (H)     Sodium 05/04/2023 141     Potassium 05/04/2023 4.3     Chloride 05/04/2023 101     Bicarbonate 05/04/2023 30     Anion Gap 05/04/2023 14     Urea Nitrogen 05/04/2023 11     Creatinine 05/04/2023 1.17     GFR MALE 05/04/2023 67     Calcium 05/04/2023 10.4     Albumin 05/04/2023 4.9     Alkaline Phosphatase 05/04/2023 71     Total Protein 05/04/2023 7.2     AST 05/04/2023 19     Total Bilirubin 05/04/2023 0.7     ALT (SGPT) 05/04/2023 18     Hemoglobin A1C 05/04/2023 5.8 (A)     Estimated Average Glucose 05/04/2023 120     Cholesterol 05/04/2023 213 (H)     HDL 05/04/2023 51.3     Cholesterol/HDL Ratio 05/04/2023 4.2     LDL 05/04/2023 139 (H)     VLDL 05/04/2023 23     Triglycerides 05/04/2023 114     Vitamin B-12 05/04/2023 795     TSH 05/04/2023 2.56          CT Head without IV Contrast: No results found for this or any previous visit from the past 365 days.      MR Brain with and without IV Contrast: No results found for this or any previous visit from the past 365 days.     MR Angio Head with and without IV Contrast: No results found for this or any previous visit from the past 365 days.        Assessment/Plan  Chavo Jordan is a 70yo male with PMHx of migraines, OA, LENORE, HLD, anxiety, presenting to establish care for migraines. Given the frequency and description of headaches, Chavo likely continues to be experiencing migraines. It appears the migraines are cervicogenic, with multiple starting in the neck, occur in the morning with neck pain upon awakening, and improvement with stretching. No red flag symptoms to indicate imaging at this time, neurological examination is reassuring. Sleep study showed mild LENORE. Will check ESR and CRP, will follow with sleep medicine next month, I encouraged him to start Nurtec for migraine prevention, follow-up with me in 6 months or  earlier if needed.     PLAN:    -We will start Nurtec every other day for headache prevention  -C/w sumatriptan and indomethacin for acute headache treatment.    -We discussed medication overuse headache in details  -Check ESR and CRP  -Please follow with sleep medicine for LENORE and need for CPAP/BiPap  -Please keep a headache diary for each headache to better characterize these events, use migraine fallon kaya to help track migraines  -Can consider nerve block in the future if these interventions do not improve symptoms  -Follow-up in 6 months or earlier if needed    Please call [phone] if you have any questions.

## 2024-10-24 NOTE — PATIENT INSTRUCTIONS
You were seen today by Dr. Neal.  It is a pleasure seeing you.    Please start Nurtec for migraine prevention every other day.    PLAN:    -We will start Nurtec every other day for headache prevention  -C/w sumatriptan and indomethacin for acute headache treatment.    -We discussed medication overuse headache in details  -Check ESR and CRP  -Please follow with sleep medicine for LENORE and need for CPAP/BiPap  -Please keep a headache diary for each headache to better characterize these events, use migraine fallon kaya to help track migraines  -Can consider nerve block in the future if these interventions do not improve symptoms  -Follow-up in 6 months or earlier if needed    Please call [phone] if you have any questions.

## 2024-11-12 PROBLEM — Z86.39 HISTORY OF HYPERCHOLESTEROLEMIA: Status: ACTIVE | Noted: 2024-11-12

## 2024-11-12 PROBLEM — G45.4 TRANSIENT GLOBAL AMNESIA: Status: ACTIVE | Noted: 2024-11-12

## 2024-11-14 ENCOUNTER — OFFICE VISIT (OUTPATIENT)
Dept: SLEEP MEDICINE | Facility: CLINIC | Age: 69
End: 2024-11-14
Payer: MEDICARE

## 2024-11-14 VITALS
OXYGEN SATURATION: 98 % | DIASTOLIC BLOOD PRESSURE: 72 MMHG | HEART RATE: 67 BPM | HEIGHT: 74 IN | BODY MASS INDEX: 24.55 KG/M2 | SYSTOLIC BLOOD PRESSURE: 128 MMHG | WEIGHT: 191.3 LBS

## 2024-11-14 DIAGNOSIS — Z71.2 ENCOUNTER TO DISCUSS TEST RESULTS: Primary | ICD-10-CM

## 2024-11-14 DIAGNOSIS — R09.81 NASAL CONGESTION: ICD-10-CM

## 2024-11-14 DIAGNOSIS — I10 BENIGN ESSENTIAL HYPERTENSION: ICD-10-CM

## 2024-11-14 DIAGNOSIS — G47.61 PLMD (PERIODIC LIMB MOVEMENT DISORDER): ICD-10-CM

## 2024-11-14 PROBLEM — G47.33 OSA (OBSTRUCTIVE SLEEP APNEA): Status: RESOLVED | Noted: 2023-04-17 | Resolved: 2024-11-14

## 2024-11-14 PROCEDURE — 3008F BODY MASS INDEX DOCD: CPT | Performed by: NURSE PRACTITIONER

## 2024-11-14 PROCEDURE — 1160F RVW MEDS BY RX/DR IN RCRD: CPT | Performed by: NURSE PRACTITIONER

## 2024-11-14 PROCEDURE — 1036F TOBACCO NON-USER: CPT | Performed by: NURSE PRACTITIONER

## 2024-11-14 PROCEDURE — 3078F DIAST BP <80 MM HG: CPT | Performed by: NURSE PRACTITIONER

## 2024-11-14 PROCEDURE — 1159F MED LIST DOCD IN RCRD: CPT | Performed by: NURSE PRACTITIONER

## 2024-11-14 PROCEDURE — 99215 OFFICE O/P EST HI 40 MIN: CPT | Performed by: NURSE PRACTITIONER

## 2024-11-14 PROCEDURE — 3074F SYST BP LT 130 MM HG: CPT | Performed by: NURSE PRACTITIONER

## 2024-11-14 RX ORDER — AZELASTINE 1 MG/ML
1 SPRAY, METERED NASAL 2 TIMES DAILY
Qty: 30 ML | Refills: 11 | Status: SHIPPED | OUTPATIENT
Start: 2024-11-14

## 2024-11-14 ASSESSMENT — COLUMBIA-SUICIDE SEVERITY RATING SCALE - C-SSRS
1. IN THE PAST MONTH, HAVE YOU WISHED YOU WERE DEAD OR WISHED YOU COULD GO TO SLEEP AND NOT WAKE UP?: NO
2. HAVE YOU ACTUALLY HAD ANY THOUGHTS OF KILLING YOURSELF?: NO
6. HAVE YOU EVER DONE ANYTHING, STARTED TO DO ANYTHING, OR PREPARED TO DO ANYTHING TO END YOUR LIFE?: NO

## 2024-11-14 ASSESSMENT — SLEEP AND FATIGUE QUESTIONNAIRES
DIFFICULTY_FALLING_ASLEEP: MILD
HOW LIKELY ARE YOU TO NOD OFF OR FALL ASLEEP WHILE SITTING QUIETLY AFTER LUNCH WITHOUT ALCOHOL: WOULD NEVER DOZE
HOW LIKELY ARE YOU TO NOD OFF OR FALL ASLEEP WHEN YOU ARE A PASSENGER IN A CAR FOR AN HOUR WITHOUT A BREAK: WOULD NEVER DOZE
HOW LIKELY ARE YOU TO NOD OFF OR FALL ASLEEP IN A CAR, WHILE STOPPED FOR A FEW MINUTES IN TRAFFIC: WOULD NEVER DOZE
ESS-CHAD TOTAL SCORE: 3
SLEEP_PROBLEM_NOTICEABLE_TO_OTHERS: NOT AT ALL NOTICEABLE
SITING INACTIVE IN A PUBLIC PLACE LIKE A CLASS ROOM OR A MOVIE THEATER: WOULD NEVER DOZE
HOW LIKELY ARE YOU TO NOD OFF OR FALL ASLEEP WHILE SITTING AND TALKING TO SOMEONE: WOULD NEVER DOZE
SLEEP_PROBLEM_INTERFERES_DAILY_ACTIVITIES: A LITTLE
HOW LIKELY ARE YOU TO NOD OFF OR FALL ASLEEP WHILE LYING DOWN TO REST IN THE AFTERNOON WHEN CIRCUMSTANCES PERMIT: HIGH CHANCE OF DOZING
HOW LIKELY ARE YOU TO NOD OFF OR FALL ASLEEP WHILE SITTING AND READING: WOULD NEVER DOZE
SATISFACTION_WITH_CURRENT_SLEEP_PATTERN: SATISFIED
WORRIED_DISTRESSED_DUE_TO_SLEEP: A LITTLE
DIFFICULTY_STAYING_ASLEEP: MILD
HOW LIKELY ARE YOU TO NOD OFF OR FALL ASLEEP WHILE WATCHING TV: WOULD NEVER DOZE

## 2024-11-14 ASSESSMENT — PATIENT HEALTH QUESTIONNAIRE - PHQ9
2. FEELING DOWN, DEPRESSED OR HOPELESS: NOT AT ALL
1. LITTLE INTEREST OR PLEASURE IN DOING THINGS: NOT AT ALL
SUM OF ALL RESPONSES TO PHQ9 QUESTIONS 1 AND 2: 0

## 2024-11-14 ASSESSMENT — ENCOUNTER SYMPTOMS
DEPRESSION: 0
OCCASIONAL FEELINGS OF UNSTEADINESS: 0
LOSS OF SENSATION IN FEET: 0

## 2024-11-14 NOTE — ASSESSMENT & PLAN NOTE
Reviewed recent PSG in detail  -Per CMS scoring criteria, no significant sleep apnea currently  -Discussed option to revisit dental appliance for snoring + / - netti pot and azelastine spray. He verbalized understanding  -Discussed option to repeat sleep study. The two most recent studies were negative for sleep disordered breathing, however. He verbalized understanding

## 2024-11-14 NOTE — ASSESSMENT & PLAN NOTE
Recommended regular use of netti pot + azelatine nasal spray to follow  Humidifier may also help  Consider ENT/ allergist referral if persistent

## 2024-11-14 NOTE — ASSESSMENT & PLAN NOTE
Mild - 1-2/ per hour with PLM based arousals on recent PSG  Iron studies WNL  Attempted low dose gabapentin without benefit

## 2024-11-14 NOTE — PATIENT INSTRUCTIONS
Nasal irrigation (netti pot) - chased with nasal spray (azelastine)  Consider dental appliance  You do not need a PAP machine per recent sleep study  Consider ENT referral         [facility] Sleep Medicine  [facility] [id number] [facility] [address]       NAME: Chavo Jordan   DATE:  11/14/24    DIAGNOSIS:   No diagnosis found.    Thank you for coming to the Sleep Medicine Clinic today! Your sleep medicine provider today was: Mone Key, JOSE ANGEL-CNP Below is a summary of your treatment plan, other important information, and our contact numbers:    TREATMENT PLAN:   - Follow-up in PRN  - If not already done, sign up for 'My Chart' and send prescription requests or messages through this    Instructions - Common LENORE Recs: - For your sleep apnea, continue to use your PAP every night and use it whenever you are sleeping.   - Avoid alcohol or sedatives several hours prior to sleeping.   - Get additional supplies for your PAP (e.g., mask, hose, filters) every 3 months or as your insurance allows from your [employer] company. Replacement cushions for your PAP mask can be requested monthly if airseals are an issue.  - Remember to clean your mask, tubings, and water chamber regularly as instructed.  - Avoid driving or operating heavy machinery when drowsy. A person driving while sleepy is five (5) times more likely to have an accident. If you feel sleepy, pull over and take a short power nap (sleep for less than 30 minutes). Otherwise, ask somebody to drive you.    EASY WAYS TO IMPROVE YOUR SLEEP:  1. Go to bed and wake up at the same time every day.   Aim for 8 hours but some people need less, some need more.   Get out of bed if you are not sleeping.   Limit naps to 20 min or less.   2. Expose yourself to daylight and/ or bright light in the morning.   Go outside or spend time near a window each morning.   You can use a light box (found on [employer]) if you  wake before the sunrise.   Limit light exposure in the evenings (including electronic usage).   Try meditation, reading, stretching, deep breathing, warm shower or bath, or yoga nidra as part of your bedtime routine. There are many great FREE, videos or audio tracks on [employer]/ [employer], etc for guidance.  3. Exercise, in some form, EVERY day, but not too close to bedtime. Consider making this part of your routine at the start of your day, followed by a cool shower.  4. Eat meals at roughly the same time every day. Make sure you are prioritizing fruits, vegetables, whole grains, lean proteins.  5. Time your caffeine intake. Make sure you are not drinking caffeine within 8 to 12 hours prior to your bedtime.   6. Avoid marijuana, alcohol, and nicotine. They will reduce sleep quality in any quantity.  7. Learn to manage anxiety. Psychology services at  can be reached at [phone] to schedule an appointment.     IMPORTANT INFORMATION:     Call [phone] for medical emergencies.  Our offices are generally open from Monday-Friday, 9 am - 5 pm.  If you need to get in touch with me, you may either call me and my team(number is below) or you can use [employer].  If a referral for a test, for CPAP, or for another specialist was made, and you have not heard about scheduling this within a week, please call scheduling at [phone] ([phone]).  If you are unable to make your appointment for clinic or an overnight study, kindly call the office at least 48 hours in advance to cancel and reschedule.  If you are on CPAP, please bring your device's card to each clinic appointment unless told otherwise by your provider.  There are no supporting services by either the sleep doctors or their staff on weekends and Holidays, or after 5 PM on weekdays.   If you have been asked to come to a sleep study, make sure you bring toiletries, a comfy pillow, and any nighttime medications that you may regularly take. Also be sure to eat dinner before you  arrive. We generally do not provide meals.      PRESCRIPTIONS:  We require 7 days advanced notice for prescription refills. If we do not receive the request in this time, we cannot guarantee that your medication will be refilled in time. Please contact the sleep nurses listed below for refills or request via [employer].     IMPORTANT PHONE NUMBERS:   Sleep Medicine Clinic Fax: [phone]  Appointments (for Pediatric Sleep Clinic): [phone] - option 1  Appointments (for Adult Sleep Clinic): [phone] - option 2  Appointments (For Sleep Studies): [phone] - option 3  Behavioral Sleep Medicine: [phone]  Sleep Surgery: [phone]  ENT (Otolaryngology): [phone]  Headache Clinic (Neurology): [phone]  Neurology: [phone]  Psychiatry: [phone]  Pulmonary Function Testing (PFT) Center: [phone]  Pulmonary Medicine: [phone]  [employer] (DME): [phone]  [facility] (DME): [phone]  [facility] (DME): [phone]    Our Adult Sleep Medicine Team (Please do not hesitate to call the office or sleep nurse with any questions between appointments):    Adult Sleep Nurses (Gala Parker, RN and Liberty Sotelo RN):  For clinical questions and refilling prescriptions: [phone]  Email sleep diaries and other documents at: [email or web address]    Adult Sleep Medicine Secretaries:  Amanda Collins (For Tereso/Martinez/Krise/Strohl/Yeh): P: [phone]  F: [phone]  Duong Bruce ([place])Office: [phone] option 4 Office Fax: [phone]  Johana Saxena (For Sue): P: [phone]  Fax: [phone]  Nuria Carrillo (For Jurcevic/Blank): P: [phone]  F: [phone]  Ana Laura Villegas (For Pancho): P: [phone]  F: [phone]  Nicole Shannon (For Cecy/Prakash/Allegra): P: [phone]  F: [phone]  Tiffanie Younger (For Aniket/David): P: [phone]  F: [phone]     Adult Sleep Medicine  "Advanced Practice Providers:  Cortez Bermudez ([place], [place])  Aziza Randall ([place], [facility])  Mone Key CNP ([place], [place], [place])  Guillermina Palacio CNP ([place], [place], [place])  Raven Deleon ([place], [place], [place])  Clement Hernandez CNP ([place], [place])      Our Sleep Testing Center (STC) Locations:  Our team will contact you to schedule your sleep study, however, you can contact us as follow:  Main Phone Line (scheduling only): [phone]), option 3  Adult and Pediatric Locations  [facility] (6 years and older): [facility] - 4th floor ([facility]) After hours line: [phone]  [facility] (Main campus: All ages): [facility], 6th floor. After hours line: [phone]   [place] (5 years and older; younger considered on case-by-case basis): [address]; [address], [address]. Scheduling  After hours line: [phone]   [place] (6 years and older): [address]; [address]; [address]   [place] (6 years and older): [address] [facility]  After hours line: [phone]   [employer] (13 years and older) in [place]: [address], 2nd floor  After hours line: [phone]  [facility] (13 year and older): [address]  After hours line: [phone] (Home studies out of [facility])    Adult Only Locations:   [place] (18 years and older): [address] [facility], 2nd floor   [place] (18 years and older): [address] [facility]; 4th floor  After hours line: [phone]   [place] (18 years and older) at [place]: [address] [place] [address]  After hours line: [phone]        CONTACTING YOUR SLEEP MEDICINE PROVIDER:  Send a message directly to your provider through \"My Chart\", which is the email service through your  Records Account: [email or web address]   Call [phone] and leave a message. One " "of the administrative assistants will forward the message to your sleep medicine provider through \"My Chart\" and/or email.     Your sleep medicine provider for this visit was: JUAN Ferreira    In the event that you are running more than 15 minutes late to your appointment, I will kindly ask you to reschedule.       "

## 2024-11-17 DIAGNOSIS — G43.711 CHRONIC MIGRAINE WITHOUT AURA, WITH INTRACTABLE MIGRAINE, SO STATED, WITH STATUS MIGRAINOSUS: ICD-10-CM

## 2024-11-17 DIAGNOSIS — G43.009 MIGRAINE WITHOUT AURA AND WITHOUT STATUS MIGRAINOSUS, NOT INTRACTABLE: ICD-10-CM

## 2024-11-26 ENCOUNTER — APPOINTMENT (OUTPATIENT)
Dept: PRIMARY CARE | Facility: CLINIC | Age: 69
End: 2024-11-26
Payer: MEDICARE

## 2024-11-26 VITALS
DIASTOLIC BLOOD PRESSURE: 73 MMHG | HEIGHT: 72 IN | RESPIRATION RATE: 10 BRPM | HEART RATE: 69 BPM | OXYGEN SATURATION: 98 % | BODY MASS INDEX: 25.99 KG/M2 | SYSTOLIC BLOOD PRESSURE: 125 MMHG | WEIGHT: 191.9 LBS

## 2024-11-26 DIAGNOSIS — R41.3 MEMORY LOSS: ICD-10-CM

## 2024-11-26 DIAGNOSIS — Z87.19 HISTORY OF BARRETT ESOPHAGUS: ICD-10-CM

## 2024-11-26 DIAGNOSIS — Z71.89 GOALS OF CARE, COUNSELING/DISCUSSION: ICD-10-CM

## 2024-11-26 DIAGNOSIS — Z00.00 MEDICARE ANNUAL WELLNESS VISIT, SUBSEQUENT: Primary | ICD-10-CM

## 2024-11-26 DIAGNOSIS — G43.809 OTHER MIGRAINE WITHOUT STATUS MIGRAINOSUS, NOT INTRACTABLE: ICD-10-CM

## 2024-11-26 PROCEDURE — 1123F ACP DISCUSS/DSCN MKR DOCD: CPT | Performed by: INTERNAL MEDICINE

## 2024-11-26 PROCEDURE — 3008F BODY MASS INDEX DOCD: CPT | Performed by: INTERNAL MEDICINE

## 2024-11-26 PROCEDURE — 1159F MED LIST DOCD IN RCRD: CPT | Performed by: INTERNAL MEDICINE

## 2024-11-26 PROCEDURE — G0439 PPPS, SUBSEQ VISIT: HCPCS | Performed by: INTERNAL MEDICINE

## 2024-11-26 PROCEDURE — 3074F SYST BP LT 130 MM HG: CPT | Performed by: INTERNAL MEDICINE

## 2024-11-26 PROCEDURE — 1158F ADVNC CARE PLAN TLK DOCD: CPT | Performed by: INTERNAL MEDICINE

## 2024-11-26 PROCEDURE — 3078F DIAST BP <80 MM HG: CPT | Performed by: INTERNAL MEDICINE

## 2024-11-26 PROCEDURE — 1170F FXNL STATUS ASSESSED: CPT | Performed by: INTERNAL MEDICINE

## 2024-11-26 PROCEDURE — 1036F TOBACCO NON-USER: CPT | Performed by: INTERNAL MEDICINE

## 2024-11-26 PROCEDURE — 99213 OFFICE O/P EST LOW 20 MIN: CPT | Performed by: INTERNAL MEDICINE

## 2024-11-26 RX ORDER — OMEPRAZOLE 20 MG/1
CAPSULE, DELAYED RELEASE ORAL
Qty: 180 CAPSULE | OUTPATIENT
Start: 2024-11-26

## 2024-11-26 RX ORDER — OMEPRAZOLE 20 MG/1
20 CAPSULE, DELAYED RELEASE ORAL
Qty: 60 CAPSULE | Refills: 11 | Status: SHIPPED | OUTPATIENT
Start: 2024-11-26

## 2024-11-26 RX ORDER — SUMATRIPTAN SUCCINATE 100 MG/1
100 TABLET ORAL ONCE AS NEEDED
Qty: 9 TABLET | Refills: 3 | Status: SHIPPED | OUTPATIENT
Start: 2024-11-26 | End: 2025-11-26

## 2024-11-26 ASSESSMENT — ACTIVITIES OF DAILY LIVING (ADL)
DOING_HOUSEWORK: INDEPENDENT
GROCERY_SHOPPING: INDEPENDENT
BATHING: INDEPENDENT
DRESSING: INDEPENDENT
MANAGING_FINANCES: NEEDS ASSISTANCE
TAKING_MEDICATION: INDEPENDENT

## 2024-11-26 ASSESSMENT — ENCOUNTER SYMPTOMS
OCCASIONAL FEELINGS OF UNSTEADINESS: 1
LOSS OF SENSATION IN FEET: 1
HEADACHES: 1
DEPRESSION: 0

## 2024-11-26 NOTE — ASSESSMENT & PLAN NOTE
-Imitrex keeping migraines controlled. Refilling today.  Orders:    SUMAtriptan (Imitrex) 100 mg tablet; Take 1 tablet (100 mg) by mouth 1 time if needed for migraine. Take at onset of migraine headache. May repeat in 2 hours if needed.

## 2024-11-26 NOTE — PROGRESS NOTES
"Subjective   Reason for Visit: Chavo Jordan Sr. is an 69 y.o. male here for a Medicare Wellness visit.          Reviewed all medications by prescribing practitioner or clinical pharmacist (such as prescriptions, OTCs, herbal therapies and supplements) and documented in the medical record.    Pt here for MAW.    Sometimes when walking on the track, will after some time feel shaky/tremulous and get diaphoretic. After resting will feel better. It it happens at home while at rest will eat peanut butter and crackers and feel better.    Is using CBD for arthritis which is helping.      Patient Care Team:  Preston Carter MD as PCP - General (Internal Medicine)     Review of Systems   Neurological:  Positive for headaches.       Objective   Vitals:  /73 (BP Location: Right arm, Patient Position: Sitting)   Pulse 69   Resp 10   Ht 1.791 m (5' 10.5\")   Wt 87 kg (191 lb 14.4 oz)   SpO2 98%   BMI 27.15 kg/m²       Physical Exam  Constitutional:       General: He is not in acute distress.     Appearance: He is not ill-appearing, toxic-appearing or diaphoretic.   HENT:      Head: Normocephalic and atraumatic.   Neurological:      Mental Status: He is alert.       Assessment & Plan  Medicare annual wellness visit, subsequent         Goals of care, counseling/discussion         Other migraine without status migrainosus, not intractable  -Imitrex keeping migraines controlled. Refilling today.  Orders:  •  SUMAtriptan (Imitrex) 100 mg tablet; Take 1 tablet (100 mg) by mouth 1 time if needed for migraine. Take at onset of migraine headache. May repeat in 2 hours if needed.    History of Hernandez esophagus  -Pt wanting to change nexium to prilosec since its cheaper. Medication changed now.  Orders:  •  omeprazole (PriLOSEC) 20 mg DR capsule; Take 1 capsule (20 mg) by mouth 2 times a day before meals. Do not crush or chew.    Memory loss  Pt is having more memory loss. His parents both had Alzheimers. He is agreeable with " seeing geriatrics.  Orders:  •  Referral to Geriatrics; Future         Advance Directives Discussion  Advanced Care Planning (including a Living Will, Healthcare POA, as well as specific end of life choices and/or directives), was discussed with the patient and/or surrogate, voluntarily, and details of that discussion documented in the Problem List (under Advanced Directives Discussion) of the medical record.  Pt has a living will and HCPOA. Has his wife as his HCPOA with his son Chavo as the alternative followed by his daughter Rubina. Chavo and Rubina's numbers added to the chart. Pt wants to be full code.   (~16 min spent discussing above)

## 2024-11-26 NOTE — ASSESSMENT & PLAN NOTE
Pt is having more memory loss. His parents both had Alzheimers. He is agreeable with seeing geriatrics.  Orders:    Referral to Geriatrics; Future

## 2025-01-18 DIAGNOSIS — G43.809 OTHER MIGRAINE WITHOUT STATUS MIGRAINOSUS, NOT INTRACTABLE: Primary | ICD-10-CM

## 2025-01-18 RX ORDER — INDOMETHACIN 25 MG/1
25 CAPSULE ORAL
Qty: 60 CAPSULE | Refills: 1 | OUTPATIENT
Start: 2025-01-18

## 2025-01-18 RX ORDER — INDOMETHACIN 25 MG/1
25 CAPSULE ORAL
Qty: 180 CAPSULE | Refills: 3 | Status: SHIPPED | OUTPATIENT
Start: 2025-01-18

## 2025-04-25 ENCOUNTER — APPOINTMENT (OUTPATIENT)
Dept: NEUROLOGY | Facility: HOSPITAL | Age: 70
End: 2025-04-25
Payer: MEDICARE

## 2025-04-30 ENCOUNTER — OFFICE VISIT (OUTPATIENT)
Dept: NEUROLOGY | Facility: HOSPITAL | Age: 70
End: 2025-04-30
Payer: MEDICARE

## 2025-04-30 ENCOUNTER — APPOINTMENT (OUTPATIENT)
Dept: PRIMARY CARE | Facility: CLINIC | Age: 70
End: 2025-04-30
Payer: MEDICARE

## 2025-04-30 VITALS
SYSTOLIC BLOOD PRESSURE: 136 MMHG | HEART RATE: 72 BPM | BODY MASS INDEX: 24.54 KG/M2 | DIASTOLIC BLOOD PRESSURE: 86 MMHG | HEIGHT: 73 IN | RESPIRATION RATE: 18 BRPM | WEIGHT: 185.19 LBS

## 2025-04-30 DIAGNOSIS — G43.809 OTHER MIGRAINE WITHOUT STATUS MIGRAINOSUS, NOT INTRACTABLE: ICD-10-CM

## 2025-04-30 PROCEDURE — 3075F SYST BP GE 130 - 139MM HG: CPT | Performed by: PSYCHIATRY & NEUROLOGY

## 2025-04-30 PROCEDURE — 1123F ACP DISCUSS/DSCN MKR DOCD: CPT | Performed by: PSYCHIATRY & NEUROLOGY

## 2025-04-30 PROCEDURE — 99213 OFFICE O/P EST LOW 20 MIN: CPT | Performed by: PSYCHIATRY & NEUROLOGY

## 2025-04-30 PROCEDURE — 3079F DIAST BP 80-89 MM HG: CPT | Performed by: PSYCHIATRY & NEUROLOGY

## 2025-04-30 PROCEDURE — 1159F MED LIST DOCD IN RCRD: CPT | Performed by: PSYCHIATRY & NEUROLOGY

## 2025-04-30 PROCEDURE — G2211 COMPLEX E/M VISIT ADD ON: HCPCS | Performed by: PSYCHIATRY & NEUROLOGY

## 2025-04-30 PROCEDURE — 1125F AMNT PAIN NOTED PAIN PRSNT: CPT | Performed by: PSYCHIATRY & NEUROLOGY

## 2025-04-30 PROCEDURE — 3008F BODY MASS INDEX DOCD: CPT | Performed by: PSYCHIATRY & NEUROLOGY

## 2025-04-30 RX ORDER — SUMATRIPTAN SUCCINATE 100 MG/1
100 TABLET ORAL ONCE AS NEEDED
Qty: 9 TABLET | Refills: 11 | Status: SHIPPED | OUTPATIENT
Start: 2025-04-30 | End: 2026-04-30

## 2025-04-30 ASSESSMENT — PAIN SCALES - GENERAL: PAINLEVEL_OUTOF10: 10-WORST PAIN EVER

## 2025-04-30 NOTE — PROGRESS NOTES
Chief Complaint: Jane Jordan Milagro is a 70 y.o. year old man who presents for a follow-up visit for headaches.     BETSY Tony started getting headaches at age 8.  Headaches gradually worsening in frequency and severity over the course of 61 years. Generally, headaches last about  1-2  hours in duration, some last for 4 days, twice a month. Patient otherwise has 14/30 headache days per month. The headaches are usually pressure around the eye, can travel to the neck or back of the head. symmetric. The patient rates her most severe headaches a 4 in intensity, indomethacin knocks headaches to a 0-1. Takes Sumatriptan with indomethacin half the time so he doesn't run out of pills. Associated nausea, photophobia, and phonophobia. Headaches are worsened with exertion. Triggers include waking up in the morning, looking upwards for long periods of time, TV screens, hungry, bad smells.     Interval history:    He continues using sumatriptan, indomethacin and Excedrin.  He took sumatriptan 5-6 times in a month, indomethacin about 7 times and Excedrin 3-4 times a month.  He rode his bike for 27 miles on Thursday .   The numbers of headaches have been the same but th intensity is variable and most of the times are between 3-5 on VAS but they can go to 9/10 at times.  No jaw claudications. Didn't complete ESR and CRP ordered before.  He didn't try Nurtec for migraine prevention and I encouraged him to start it but it is too expensive for him.  He tried Robaxin but it didn't help much.    Prior history:  He had sleep study on 8/30 and it showed mild LENORE, he has a follow-up appointment scheduled on 11/14 with sleep medicine.   He continues using sumatriptan, indomethacin and Excedrin.  He took sumatriptan 7 times in a month, indomethacin about 7 times and Excedrin 3-4 times a month.  He rode his bike for 30 miles yesterday.   The numbers of headaches have been the same but th intensity is variable and most of  the times are between 3-5 on VAS.  He didn't try Nurtec for migraine prevention and I encouraged him to start it but it is too expensive for him.  He tried Robaxin but it didn't help much.  He tried botox in the past and it was so painful for him, didn't help much for headache.        Pt was a patient of Dr. Cui's from 10/2019-4/2020. Here he received Botox treatment. Prior to this was seen at [facility] in the [facility]. States he has seen multiple neurologists. Botox was not effective after 2 rounds. History of sleep apnea, states it went away after he lost weight, no longer wearing BiPap. Wife states pt is snoring again. Sleeping 7-8 hours, not restful sleep, not refreshed in the morning.     Pt wakes up with a headache almost every morning. Stretching, hot showers, NSAIDs, excedrin, sumatriptan helps.     Chavo does not experience headache aura. States he used to in his 20s, but no longer experiencing. Associated symptoms including tinnitus. Denies any focal symptoms, including weakness, paresthesias, vision changes, dizziness, imbalance.     Work attendance or other daily activities are affected by the headaches.     Patient also c/f cognitive decline. Wife Maritza also concerned. Gets lost at stop signs, increasing forgetfulness. Sense of balance off, unable to fix things like he use to.  States he is taking donepezil as of 1 month ago, prescribed by PCP.     Current Acute Headache Treatment Indomethacin, Excedrin, sumatriptan (cuts in half),    Current Preventative Headache Treatment 4-5 cups coffee/day   Previous Acute Headache Treatment Sumatriptan oral and injection. Oral now  Maxalt  Ergotamine- effective but was off the market for a while.   Previous Preventative Headache Treatment topamax- Does not remember impact but no longer taking  Amitriptyline ineffective  Nortriptyline ineffective  Depakote ineffective  Propranolol ineffective  Emgality ineffective Caused headache and not covered well.   Cymbalta  ineffective  Botox x2-3         ROS: As per HPI, otherwise all other systems have been reviewed are negative for complaint.      Family history: migraines with mom, son and daughter.      Medical History           Past Medical History:   Diagnosis Date    Cervicalgia       Neck pain    Cervicalgia       Neck pain    Chronic rhinitis 06/21/2019     Chronic rhinitis    Migraine, unspecified, not intractable, without status migrainosus 10/29/2019     Migraine    Obstructive sleep apnea (adult) (pediatric) 11/21/2018     LENORE (obstructive sleep apnea)    Other intervertebral disc degeneration, lumbar region       DDD (degenerative disc disease), lumbar    Personal history of other diseases of the circulatory system       Personal history of coronary atherosclerosis    Personal history of other diseases of the digestive system       History of gastroesophageal reflux (GERD)    Personal history of other diseases of the musculoskeletal system and connective tissue       History of osteoarthritis    Personal history of other diseases of the nervous system and sense organs 09/06/2018     History of hearing loss    Personal history of other diseases of the nervous system and sense organs 05/18/2015     History of migraine    Personal history of other diseases of the nervous system and sense organs 03/16/2015     History of eustachian tube dysfunction    Personal history of other diseases of the respiratory system 03/30/2015     History of upper respiratory infection    Personal history of other endocrine, nutritional and metabolic disease       History of hypercholesterolemia    Personal history of other specified conditions       History of syncope    Personal history of other specified conditions 12/05/2016     History of persistent cough    Transient global amnesia       Transient global amnesia         Surgical History             Past Surgical History:   Procedure Laterality Date    APPENDECTOMY   03/03/2014      Appendectomy    OTHER SURGICAL HISTORY   09/06/2018     Anterior Gastropexy For Hiatal Hernia    OTHER SURGICAL HISTORY   03/03/2014     Distal Reinsertion Of Ruptured Biceps Tendon    VASECTOMY   02/25/2014     Surgery Vas Deferens Vasectomy         Family History   No family history on file.      Social History                Tobacco Use    Smoking status: Former       Types: Cigarettes    Smokeless tobacco: Never   Substance Use Topics    Alcohol use: Never            Objective  There were no vitals taken for this visit.     Neuro Exam:  Cardiac Exam: No apparent edema of b/l lower extremities  Neurological Exam:  MENTAL STATUS:   General Appearance: No distress, alert, interactive, and cooperative. Orientation was normal to time, place and person. Recent and remote memory was intact.      CRANIAL NERVES:   CN 2         Visual fields full to confrontation.   CN 3, 4, 6   Pupils round, 4 mm in diameter, equally reactive to light. Lids symmetric; no ptosis. EOMs normal alignment, full range with normal saccades, pursuit and convergence.   No nystagmus.   CN 5   Facial sensation intact bilaterally.   CN 7   Normal and symmetric facial strength. Nasolabial folds symmetric.   CN 8   Hearing intact to conversation and finger rub.  CN 9, 10   Palate elevates symmetrically.  CN 11   Normal strength of shoulder shrug and neck turning.   CN 12   Tongue midline, with normal bulk and strength; no fasciculations.      MOTOR:   Muscle bulk and tone were normal in both upper and lower extremities.   No pronator drift bilaterally.  No fasciculations, tremor or other abnormal movements evident with the patient examined clothed.     STRENGTH:     R           L  Deltoid            5          5  Biceps              5          5  Triceps             5          5     Hip [id number]  Knee Flex55  Knee Ex55     REFLEXES:       R          L  Biceps              2          2                     Triceps             2          2  Patellar             2          2      SENSORY:   In both upper and lower extremities, sensation was intact to light touch.     COORDINATION:   In both upper extremities, finger-nose-finger was intact without dysmetria or overshoot.      GAIT:   Station was stable with a normal base. Gait was stable with a normal arm swing and speed. No ataxia, shuffling, steppage or waddling was present. No circumduction was present. No Romberg sign was present.     Physical Exam     Results            No visits with results within 2 Month(s) from this visit.   Latest known visit with results is:   Lab on 05/04/2023   Component Date Value    WBC 05/04/2023 8.6     nRBC 05/04/2023 0.0     RBC 05/04/2023 5.66     Hemoglobin 05/04/2023 15.8     Hematocrit 05/04/2023 48.0     MCV 05/04/2023 85     MCHC 05/04/2023 32.9     Platelets 05/04/2023 261     RDW 05/04/2023 13.2     Glucose 05/04/2023 121 (H)     Sodium 05/04/2023 141     Potassium 05/04/2023 4.3     Chloride 05/04/2023 101     Bicarbonate 05/04/2023 30     Anion Gap 05/04/2023 14     Urea Nitrogen 05/04/2023 11     Creatinine 05/04/2023 1.17     GFR MALE 05/04/2023 67     Calcium 05/04/2023 10.4     Albumin 05/04/2023 4.9     Alkaline Phosphatase 05/04/2023 71     Total Protein 05/04/2023 7.2     AST 05/04/2023 19     Total Bilirubin 05/04/2023 0.7     ALT (SGPT) 05/04/2023 18     Hemoglobin A1C 05/04/2023 5.8 (A)     Estimated Average Glucose 05/04/2023 120     Cholesterol 05/04/2023 213 (H)     HDL 05/04/2023 51.3     Cholesterol/HDL Ratio 05/04/2023 4.2     LDL 05/04/2023 139 (H)     VLDL 05/04/2023 23     Triglycerides 05/04/2023 114     Vitamin B-12 05/04/2023 795     TSH 05/04/2023 2.56          CT Head without IV Contrast: No results found for this or any previous visit from the past 365 days.      MR Brain with and without IV Contrast: No results found for this or any previous visit from the past 365 days.     MR Angio Head with and without IV Contrast: No results found for this or  any previous visit from the past 365 days.        Assessment/Plan  Chavo Jordan is a 70 y.o. man with PMHx of migraines, OA, LENORE, HLD, anxiety, presenting today for a follow-up of migraines. It appears the migraines are cervicogenic, with multiple starting in the neck, occur in the morning with neck pain upon awakening, and improvement with stretching. No red flag symptoms to indicate imaging at this time, neurological examination is reassuring. Sleep study showed mild LENORE. Will check ESR and CRP although characteristics are less likely due to GCA, will follow with sleep medicine as well, I encouraged him to start Nurtec for migraine prevention, but he found it too expensive, he feels migraine is controlled with current regimen. Follow-up to be arranged.    PLAN:     -C/w sumatriptan and indomethacin for acute headache treatment.    -We discussed medication overuse headache in details  -Check ESR and CRP to check for possible GCA  -Please follow with sleep medicine for LENORE and need for CPAP/BiPap  -Please keep a headache diary for each headache to better characterize these events, use migraine fallon kaya to help track migraines  -Can consider nerve block in the future if these interventions do not improve symptoms  -Follow-up to be arranged    Please call [phone] if you have any questions.     Time spent 26 min on the day of service, which included preparing to see the patient, face-to-face patient care, completing clinical documentation, obtaining and/or reviewing separately obtained history, performing a medically appropriate examination, counseling and educating the patient/family/caregiver, and ordering medications, tests, or procedures.

## 2025-04-30 NOTE — PATIENT INSTRUCTIONS
You were seen today by Dr. Neal.  It is a pleasure seeing you.    Given the history and evaluation, symptoms are consistent with migraine.    PLAN:     -C/w sumatriptan and indomethacin for acute headache treatment.    -We discussed medication overuse headache in details  -Check ESR and CRP  -Please follow with sleep medicine for LENORE and need for CPAP/BiPap  -Please keep a headache diary for each headache to better characterize these events, use migraine fallon kaya to help track migraines  -Can consider nerve block in the future if these interventions do not improve symptoms  -Follow-up to be arranged

## 2025-05-02 ENCOUNTER — OFFICE VISIT (OUTPATIENT)
Dept: PRIMARY CARE | Facility: CLINIC | Age: 70
End: 2025-05-02
Payer: MEDICARE

## 2025-05-02 VITALS
HEART RATE: 68 BPM | WEIGHT: 184.7 LBS | RESPIRATION RATE: 18 BRPM | DIASTOLIC BLOOD PRESSURE: 72 MMHG | SYSTOLIC BLOOD PRESSURE: 122 MMHG | OXYGEN SATURATION: 98 % | BODY MASS INDEX: 24.37 KG/M2

## 2025-05-02 DIAGNOSIS — F41.9 ANXIETY: Primary | ICD-10-CM

## 2025-05-02 PROCEDURE — 3078F DIAST BP <80 MM HG: CPT | Performed by: INTERNAL MEDICINE

## 2025-05-02 PROCEDURE — 1159F MED LIST DOCD IN RCRD: CPT | Performed by: INTERNAL MEDICINE

## 2025-05-02 PROCEDURE — 3074F SYST BP LT 130 MM HG: CPT | Performed by: INTERNAL MEDICINE

## 2025-05-02 PROCEDURE — 1123F ACP DISCUSS/DSCN MKR DOCD: CPT | Performed by: INTERNAL MEDICINE

## 2025-05-02 PROCEDURE — 99213 OFFICE O/P EST LOW 20 MIN: CPT | Performed by: INTERNAL MEDICINE

## 2025-05-02 RX ORDER — BUSPIRONE HYDROCHLORIDE 5 MG/1
5 TABLET ORAL 2 TIMES DAILY
Start: 2025-05-02 | End: 2026-05-02

## 2025-05-02 ASSESSMENT — ENCOUNTER SYMPTOMS: DECREASED CONCENTRATION: 1

## 2025-05-02 NOTE — PROGRESS NOTES
Subjective   Patient ID: Chavo Jordan Sr. is a 70 y.o. male who presents for Follow-up.    Since last visit pt's arthritis has worsened.    Has had more of 'fuzzy' feeling along his head.    Has continued having memory issues. Feels like he can't keep everything arranged and in order. Feels overwhelmed trying to keep up with everything.    When pt stopped buspar he doesn't think anxiety worsened. He isn't sure if that is when his memory worsened.        Review of Systems   Psychiatric/Behavioral:  Positive for decreased concentration.        /72 (BP Location: Right arm, Patient Position: Sitting) Comment: manual  Pulse 68   Resp 18   Wt 83.8 kg (184 lb 11.2 oz)   SpO2 98%   BMI 24.37 kg/m²   Objective   Physical Exam  Constitutional:       General: He is not in acute distress.     Appearance: He is not ill-appearing, toxic-appearing or diaphoretic.   HENT:      Head: Normocephalic and atraumatic.   Eyes:      Conjunctiva/sclera: Conjunctivae normal.   Neurological:      Mental Status: He is alert.         Assessment/Plan   Problem List Items Addressed This Visit           ICD-10-CM    Anxiety - Primary F41.9    -Pt took himself off buspar around 6 months ago. Has been more anxious since, but feels its more so because he is overwhelmed handling everything.  -Suggested pt resume it from now before geriatrics appt to see if that helps with anxiety and memory processing. If not then will know before geriatrics appt to see how they can then help him further.                 Preston Carter MD 05/02/25 10:10 AM

## 2025-05-02 NOTE — ASSESSMENT & PLAN NOTE
-Pt took himself off buspar around 6 months ago. Has been more anxious since, but feels its more so because he is overwhelmed handling everything.  -Suggested pt resume it from now before geriatrics appt to see if that helps with anxiety and memory processing. If not then will know before geriatrics appt to see how they can then help him further.

## 2025-05-12 ENCOUNTER — TELEPHONE (OUTPATIENT)
Dept: PRIMARY CARE | Facility: CLINIC | Age: 70
End: 2025-05-12
Payer: MEDICARE

## 2025-05-12 DIAGNOSIS — R06.02 SOB (SHORTNESS OF BREATH) ON EXERTION: Primary | ICD-10-CM

## 2025-05-12 NOTE — TELEPHONE ENCOUNTER
Patient was seen at [facility] ED last night for SOB, they did an Echo and stress test, said he was sent  home  today and they want him to see a cardiologist. He doesn't want to see cardio at [facility] wants to see one from . Can you enter a referral for him.

## 2025-05-13 ENCOUNTER — PATIENT OUTREACH (OUTPATIENT)
Dept: PRIMARY CARE | Facility: CLINIC | Age: 70
End: 2025-05-13
Payer: MEDICARE

## 2025-05-13 RX ORDER — METOPROLOL SUCCINATE 25 MG/1
25 TABLET, EXTENDED RELEASE ORAL DAILY
COMMUNITY

## 2025-05-13 NOTE — PROGRESS NOTES
Discharge Facility: [facility]  Discharge Diagnosis: dizziness  Admission Date: 5/11/2025  Discharge Date: 5/12/2025    PCP Appointment Date: none- 11/11/2025  Specialist Appointment Date:   -geriatric assessment 5/27/2025  -cardiology 6/24/2025    Hospital Encounter and Summary Linked: Yes  Hospital Encounter    See discharge assessment below for further details    History of Present Illness     Chavo Jordan is a 70 year old White male with history of hiatal hernia, gastroparesis presenting to the ED for evaluation of new onset CHAVEZ. The patient [place] h[place]. On his last Hike on Thursday he noted worsening CHAVEZ that is new compared to previous hike he had. He noticed some chest pressure as well with increased lightheadedness and nausea. He had the same hike last week and was feeling fine. He noticed over the last few months a decrease in his exercise tolerance. He denied any previous heart disease. He had a LHC about 20 years which was normal.     Wrap Up  Wrap Up Additional Comments: CTS psoke with patient. He was admitted to [facility] on 5/11/2025 with dizziness. Dishcarged on 5/12/2025 with no home care needs. Patient stated that he was feeling a little better. Tired and a little weakness. Declines chest pains, shortness of breath, nausea or vomiting. Reviewed medications. Patient has not issues with obtaining the new medication. Understands discharge instructions. Patient does have an appt with cardiology. Declined an appt with PCP at this time. Patient voiced no questions or concerns at this time. Patient does have my contact information if any non-emergent needs arise. (5/13/2025 11:24 AM)  Call End Time: 1128 (5/13/2025 11:24 AM)    Engagement  Call Start Time: 1124 (5/13/2025 11:24 AM)    Medications  Medications reviewed with patient/caregiver?: Yes (5/13/2025 11:24 AM)  Is the patient having any side effects they believe may be caused by any medication additions or changes?: No  (5/13/2025 11:24 AM)  Does the patient have all medications ordered at discharge?: Yes (5/13/2025 11:24 AM)  Prescription Comments: new: toprol xl (5/13/2025 11:24 AM)  Is the patient taking all medications as directed (includes completed medication regime)?: Yes (5/13/2025 11:24 AM)  Medication Comments: see med list (5/13/2025 11:24 AM)    Appointments  Does the patient have a primary care provider?: Yes (5/13/2025 11:24 AM)  Care Management Interventions: Advised patient to make appointment (5/13/2025 11:24 AM)  Has the patient kept scheduled appointments due by today?: Yes (5/13/2025 11:24 AM)  Care Management Interventions: Advised patient to keep appointment (5/13/2025 11:24 AM)    Self Management  What is the home health agency?: none (5/13/2025 11:24 AM)  Has home health visited the patient within 72 hours of discharge?: Not applicable (5/13/2025 11:24 AM)  What Durable Medical Equipment (DME) was ordered?: n/a (5/13/2025 11:24 AM)    Patient Teaching  Does the patient have access to their discharge instructions?: Yes (5/13/2025 11:24 AM)  Care Management Interventions: Reviewed instructions with patient (5/13/2025 11:24 AM)  What is the patient's perception of their health status since discharge?: Improving (5/13/2025 11:24 AM)  Is the patient/caregiver able to teach back the hierarchy of who to call/visit for symptoms/problems? PCP, Specialist, Home Health nurse, Urgent Care, ED, 911: Yes (5/13/2025 11:24 AM)  Patient/Caregiver Education Comments: see wrap up (5/13/2025 11:24 AM)

## 2025-05-27 ENCOUNTER — OFFICE VISIT (OUTPATIENT)
Dept: GERIATRIC MEDICINE | Facility: CLINIC | Age: 70
End: 2025-05-27
Payer: MEDICARE

## 2025-05-27 ENCOUNTER — APPOINTMENT (OUTPATIENT)
Dept: GERIATRIC MEDICINE | Facility: CLINIC | Age: 70
End: 2025-05-27
Payer: MEDICARE

## 2025-05-27 VITALS
TEMPERATURE: 97.7 F | DIASTOLIC BLOOD PRESSURE: 63 MMHG | SYSTOLIC BLOOD PRESSURE: 127 MMHG | BODY MASS INDEX: 25.57 KG/M2 | HEART RATE: 64 BPM | WEIGHT: 193.8 LBS | RESPIRATION RATE: 18 BRPM

## 2025-05-27 DIAGNOSIS — F10.11 HISTORY OF ALCOHOL ABUSE: ICD-10-CM

## 2025-05-27 DIAGNOSIS — F41.1 GAD (GENERALIZED ANXIETY DISORDER): ICD-10-CM

## 2025-05-27 DIAGNOSIS — R41.3 MEMORY LOSS: Primary | ICD-10-CM

## 2025-05-27 PROCEDURE — 99214 OFFICE O/P EST MOD 30 MIN: CPT | Performed by: STUDENT IN AN ORGANIZED HEALTH CARE EDUCATION/TRAINING PROGRAM

## 2025-05-27 PROCEDURE — 1170F FXNL STATUS ASSESSED: CPT | Performed by: STUDENT IN AN ORGANIZED HEALTH CARE EDUCATION/TRAINING PROGRAM

## 2025-05-27 PROCEDURE — G2211 COMPLEX E/M VISIT ADD ON: HCPCS | Performed by: STUDENT IN AN ORGANIZED HEALTH CARE EDUCATION/TRAINING PROGRAM

## 2025-05-27 PROCEDURE — 1159F MED LIST DOCD IN RCRD: CPT | Performed by: STUDENT IN AN ORGANIZED HEALTH CARE EDUCATION/TRAINING PROGRAM

## 2025-05-27 PROCEDURE — 1157F ADVNC CARE PLAN IN RCRD: CPT | Performed by: STUDENT IN AN ORGANIZED HEALTH CARE EDUCATION/TRAINING PROGRAM

## 2025-05-27 PROCEDURE — 3074F SYST BP LT 130 MM HG: CPT | Performed by: STUDENT IN AN ORGANIZED HEALTH CARE EDUCATION/TRAINING PROGRAM

## 2025-05-27 PROCEDURE — 99204 OFFICE O/P NEW MOD 45 MIN: CPT | Performed by: STUDENT IN AN ORGANIZED HEALTH CARE EDUCATION/TRAINING PROGRAM

## 2025-05-27 PROCEDURE — 3078F DIAST BP <80 MM HG: CPT | Performed by: STUDENT IN AN ORGANIZED HEALTH CARE EDUCATION/TRAINING PROGRAM

## 2025-05-27 PROCEDURE — 36415 COLL VENOUS BLD VENIPUNCTURE: CPT

## 2025-05-27 PROCEDURE — 1125F AMNT PAIN NOTED PAIN PRSNT: CPT | Performed by: STUDENT IN AN ORGANIZED HEALTH CARE EDUCATION/TRAINING PROGRAM

## 2025-05-27 RX ORDER — THIAMINE HCL 500 MG
500 TABLET ORAL DAILY
Qty: 30 TABLET | Refills: 11 | Status: SHIPPED | OUTPATIENT
Start: 2025-05-27

## 2025-05-27 RX ORDER — VITAMIN E (DL,TOCOPHERYL ACET) 90 MG
400 CAPSULE ORAL DAILY
COMMUNITY

## 2025-05-27 RX ORDER — CETIRIZINE HYDROCHLORIDE 5 MG/1
5 TABLET ORAL DAILY
COMMUNITY

## 2025-05-27 RX ORDER — ESCITALOPRAM OXALATE 10 MG/1
10 TABLET ORAL DAILY
Qty: 30 TABLET | Refills: 5 | Status: SHIPPED | OUTPATIENT
Start: 2025-05-27 | End: 2025-11-23

## 2025-05-27 RX ORDER — CHOLECALCIFEROL (VITAMIN D3) 25 MCG
2500 TABLET,CHEWABLE ORAL DAILY
COMMUNITY

## 2025-05-27 ASSESSMENT — GERIATRIC DEPRESSION SCALE SHORT VERSION (GDS-SV)
DO YOU FEEL PRETTY WORTHLESS THE WAY YOU ARE NOW: NO
ARE YOU IN GOOD SPIRITS MOST OF THE TIME: YES
DO YOU FEEL THAT YOUR LIFE IS EMPTY: NO
DO YOU FEEL YOU HAVE MORE PROBLEMS WITH MEMORY THAN MOST: NO
DO YOU OFTEN GET BORED: NO
DO YOU THINK IT IS WONDERFUL TO BE ALIVE NOW: YES
DO YOU THINK THAT MOST PEOPLE ARE BETTER OFF THAN YOU ARE: NO
DO YOU FEEL THAT YOUR SITUATION IS HOPELESS: NO
DO YOU OFTEN FEEL HELPLESS: NO
ARE YOU BASICALLY SATISFIED WITH YOUR LIFE: YES
DO YOU FEEL FULL OF ENERGY: YES
DO YOU FEEL HAPPY MOST OF THE TIME: YES
ARE YOU AFRAID THAT SOMETHING BAD IS GOING TO HAPPEN TO YOU: NO
GDS TOTAL SCORE: 0
DO YOU PREFER TO STAY AT HOME, RATHER THAN GOING OUT AND DOING NEW THINGS: NO
HAVE YOU DROPPED MANY OF YOUR ACTIVITIES AND INTERESTS?: NO

## 2025-05-27 ASSESSMENT — ACTIVITIES OF DAILY LIVING (ADL)
USING_TELEPHONE: INDEPENDENT
HEARING - RIGHT EAR: DIFFICULTY WITH NOISE
EATING: INDEPENDENT
DOING_HOUSEWORK: INDEPENDENT
NEEDS_ASSISTANCE_WITH_FOOD: INDEPENDENT
PATIENT'S MEMORY ADEQUATE TO SAFELY COMPLETE DAILY ACTIVITIES?: YES
DRESSING YOURSELF: INDEPENDENT
USING_TRANSPORTATION: INDEPENDENT
TOILETING: INDEPENDENT
GROOMING: INDEPENDENT
BATHING: INDEPENDENT
ASSISTIVE_DEVICE: EYEGLASSES
STILL_DRIVING: YES
WALKS IN HOME: INDEPENDENT
MANAGING_FINANCES: TOTAL CARE
PILL_BOX_USED: YES
ADEQUATE_TO_COMPLETE_ADL: YES
GROCERY_SHOPPING: INDEPENDENT
PREPARING_MEALS: INDEPENDENT
HEARING - LEFT EAR: DIFFICULTY WITH NOISE
JUDGMENT_ADEQUATE_SAFELY_COMPLETE_DAILY_ACTIVITIES: YES
TAKING_MEDICATION: INDEPENDENT
FEEDING YOURSELF: INDEPENDENT

## 2025-05-27 ASSESSMENT — MONTREAL COGNITIVE ASSESSMENT (MOCA)
10. [FLUENCY] NAME WORDS STARTING WITH DESIGNATED LETTER: 0
WHAT IS THE TOTAL SCORE (OUT OF 30): 23
4. NAME EACH OF THE THREE ANIMALS SHOWN: 3
8. SERIAL SUBTRACTION OF 7S: 2
WHAT LEVEL OF EDUCATION WAS ATTAINED: 1
12. MEMORY INDEX SCORE: 2
11. FOR EACH PAIR OF WORDS, WHAT CATEGORY DO THEY BELONG TO (OUT OF 2): 2
6. READ LIST OF DIGITS [FORWARD/BACKWARD]: 2
9. REPEAT EACH SENTENCE: 2
VISUOSPATIAL/EXECUTIVE SUBSCORE: 2
13. ORIENTATION SUBSCORE: 6
7. [VIGILENCE] TAP WHEN HEARING DESIGNATED LETTER: 1
5. MEMORY TRIALS: 0

## 2025-05-27 ASSESSMENT — ENCOUNTER SYMPTOMS
LOSS OF SENSATION IN FEET: 1
OCCASIONAL FEELINGS OF UNSTEADINESS: 0

## 2025-05-27 ASSESSMENT — PAIN SCALES - GENERAL: PAINLEVEL_OUTOF10: 8
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
## 2025-05-27 NOTE — PROGRESS NOTES
Patient ID: Chavo Jordan Sr. is a 70 y.o. male who presents for cognitive evaluation.    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 no If so, explain:   Marital Status:                           ENCOUNTER SCREENING RESULTS  MOCA Total Score: 23     Geriatric Depression Scale (Short Version) Total: 0    Advance Directives/Legal/Financial    Person(s) Named on Lopez Consent to Release: Ashley, wife     Patient Healthcare POA: Ashley wife          Is Healthcare POA currently scanned into patient's chart: No but copies of advanced directives were provided at this appointment and will be scanned into chart.     DPOA for finances: NA       Legal guardian:  NA     Living Will: yes     Any form of disability? no Type:     [place]? no       Significant financial stressors: none noted    Supportive Relationships (Informal Support)     Spouse/partner information (include length of relationship, health status): Ashley, wife of 40 years, first marriage ended in divorce     Children Information (include location, level of engagement):  Chavo and Karen, both in [place]      Other social supports:  Patient is active in AA     Living situation:  home with wife    Formal Supports     Engaged community services (Emergency Alert, HHA, MOW, Case Management, Etc.): NA    Mental Health     Observed Mood: Normal     Observed Affect:calm and pleasant      Current mental health symptoms/diagnosis/treatment (include medications): Patient noted Buspar helps to keep his stress level balanced, that he doesn't react to things      Relevant Loss/Grief:NA     Relevant mental health history: NA     Self-harm/suicidal thoughts, plan: None Reported     Alcohol, illicit drug, and tobacco use reported by patient/family: Sober since 1984, continues to be active with AA     Any addiction history: Patient is a recovering alcoholic, became sober in 1984 with support of AA program     Interests/Hobbies/Activities/Daily Routine: biking, hiking, AA, time with family     Additional Notes or Follow-up Matters: Patient was engaged and open throughout the evaluation. He was a good , able to provide a detailed personal background and health history. His wife, Ashley, is very engaged and supportive.  Review team evaluation results and share information/resources as indicated.

## 2025-05-27 NOTE — PROGRESS NOTES
"Patient ID: Chavo Jordan Sr. is a 70 y.o. male who presents for memory loss and \"arthritis aches and pains.\" Former smoker and recovering alcoholic. Over past 5 years memory is getting progressively worsening. Forgetting appointment, forgets daily tasks like shutting doors and forgetting meds. Per wife states generally is in good mood but has \"snap rage, that's frightening. Verbally frightening and feels it has potential to become physical.\" Patient states he had temporary global amnesia in  and . History of migraines.     Identifying Information                              : 1955             Assessment date: 2025    Patient accompanied by: wife, Ashley          History provided by: patient and wife    Symptoms Reported (include length of time): 5 years    CONCERNS IDENTIFIED BY NURSING (Safety Risks, Health Issues, etc)     1. Anger and agitation    Safety Assessment    [place] Home Safety Risk Factors  Home Safety Risk Factors: None    Home Safety - Emergency  Does patient know what number to dial in an emergency?: Yes    Safety Concerns  Safety Concerns: None, Weapons  Safety Concerns Notes:: guns are loaded and easily accessible    Daily Functioning Assessment    ADL Screening  Patient's Vision Adequate to Safely Complete Daily Activities: Yes  Patient's Judgment Adequate to Safely Complete Daily Activities: Yes  Patient's Memory Adequate to Safely Complete Daily Activities: Yes  Patient Able to Express Needs/Desires: Yes  Which is your dominant hand?: Right  Dressing: Independent  Grooming: Independent  Feeding: Independent  Bathing: Independent  Toileting: Independent  In/Out Bed: Independent  Walks in Home: Independent  Weakness of Legs: None  Weakness of Arms/Hands: Both (arthritis related)  Hearing - Right Ear: Difficulty with noise  Hearing - Left Ear: Difficulty with noise     Assistive Devices  Assistive Devices: Eyeglasses    IADL's  Using Telephone: Independent  Grocery " Shopping: Independent  Preparing Meals: Independent  Doing Housework: Independent  Laundry: Independent  Taking Medication: Independent  Pill Box Used: Yes  Managing Finances: Total care (wife has always handled finances)  Using Transportation: Independent  Still Driving: Yes  Eating: Independent  Needs Assistance With Food: Independent  Difficulty Chewing or Swallowing: No          Health Assessment    Nutrition and Exercise  Current Diet: Well Balanced Diet  Adequate Fluid Intake: Yes  Caffeine: Yes (coffee 4 cups per day)  Appetite:: Good  Food Consistency:: Regular  Liquids Consistency:: Thin  Changes in Weight?: No  Chewing or Swallowing Problems?: No  Exercise Frequency: Regularly    Snores, diagnosed with sleep apnea. Averages 7-9 hours of sleep per night.

## 2025-05-29 ENCOUNTER — PATIENT OUTREACH (OUTPATIENT)
Dept: PRIMARY CARE | Facility: CLINIC | Age: 70
End: 2025-05-29
Payer: MEDICARE

## 2025-05-29 LAB
LEAD BLDV-MCNC: 1.2 MCG/DL
TSH SERPL-ACNC: 2.09 MIU/L (ref 0.4–4.5)
VIT B12 SERPL-MCNC: 1916 PG/ML (ref 200–1100)

## 2025-06-02 PROBLEM — I20.0 CRESCENDO ANGINA (MULTI): Status: ACTIVE | Noted: 2025-05-12

## 2025-06-10 ENCOUNTER — APPOINTMENT (OUTPATIENT)
Dept: CARDIOLOGY | Facility: CLINIC | Age: 70
End: 2025-06-10
Payer: MEDICARE

## 2025-06-12 ENCOUNTER — PATIENT OUTREACH (OUTPATIENT)
Dept: PRIMARY CARE | Facility: CLINIC | Age: 70
End: 2025-06-12
Payer: MEDICARE

## 2025-06-16 DIAGNOSIS — F41.1 GAD (GENERALIZED ANXIETY DISORDER): ICD-10-CM

## 2025-06-16 DIAGNOSIS — F41.9 ANXIETY: ICD-10-CM

## 2025-06-16 RX ORDER — BUSPIRONE HYDROCHLORIDE 15 MG/1
15 TABLET ORAL 2 TIMES DAILY
Qty: 180 TABLET | Refills: 3 | Status: SHIPPED | OUTPATIENT
Start: 2025-06-16 | End: 2026-06-16

## 2025-06-19 ENCOUNTER — HOSPITAL ENCOUNTER (OUTPATIENT)
Dept: RADIOLOGY | Facility: HOSPITAL | Age: 70
Discharge: HOME | End: 2025-06-19
Payer: MEDICARE

## 2025-06-19 DIAGNOSIS — R41.3 MEMORY LOSS: ICD-10-CM

## 2025-06-19 PROCEDURE — 0866T QUAN MRI ALYS BRN W/DX MRI: CPT

## 2025-06-24 ENCOUNTER — APPOINTMENT (OUTPATIENT)
Dept: CARDIOLOGY | Facility: CLINIC | Age: 70
End: 2025-06-24
Payer: MEDICARE

## 2025-06-24 VITALS
BODY MASS INDEX: 25.13 KG/M2 | WEIGHT: 189.6 LBS | HEIGHT: 73 IN | HEART RATE: 52 BPM | SYSTOLIC BLOOD PRESSURE: 110 MMHG | OXYGEN SATURATION: 96 % | DIASTOLIC BLOOD PRESSURE: 68 MMHG

## 2025-06-24 DIAGNOSIS — R06.02 SOB (SHORTNESS OF BREATH) ON EXERTION: ICD-10-CM

## 2025-06-24 DIAGNOSIS — I10 BENIGN ESSENTIAL HYPERTENSION: ICD-10-CM

## 2025-06-24 DIAGNOSIS — I20.0 CRESCENDO ANGINA (MULTI): Primary | ICD-10-CM

## 2025-06-24 DIAGNOSIS — E78.5 DYSLIPIDEMIA: ICD-10-CM

## 2025-06-24 PROCEDURE — 1159F MED LIST DOCD IN RCRD: CPT | Performed by: PHYSICIAN ASSISTANT

## 2025-06-24 PROCEDURE — 3008F BODY MASS INDEX DOCD: CPT | Performed by: PHYSICIAN ASSISTANT

## 2025-06-24 PROCEDURE — 1036F TOBACCO NON-USER: CPT | Performed by: PHYSICIAN ASSISTANT

## 2025-06-24 PROCEDURE — 93000 ELECTROCARDIOGRAM COMPLETE: CPT | Performed by: PHYSICIAN ASSISTANT

## 2025-06-24 PROCEDURE — 3074F SYST BP LT 130 MM HG: CPT | Performed by: PHYSICIAN ASSISTANT

## 2025-06-24 PROCEDURE — 99204 OFFICE O/P NEW MOD 45 MIN: CPT | Performed by: PHYSICIAN ASSISTANT

## 2025-06-24 PROCEDURE — 1160F RVW MEDS BY RX/DR IN RCRD: CPT | Performed by: PHYSICIAN ASSISTANT

## 2025-06-24 PROCEDURE — 3078F DIAST BP <80 MM HG: CPT | Performed by: PHYSICIAN ASSISTANT

## 2025-06-24 NOTE — PROGRESS NOTES
"Chief Complaint:   Establish Care, CHAVEZ     History Of Present Illness:    Chavo Jordan Sr. is a 70 y.o. male presenting after recent hospitalization for exertional dyspnea.  During hospitalization patient underwent 2D echo displaying normal LVSF without valvulopathy, nuclear stress test was without perfusion abnormalities.  Patient denies chest pain, chest pressure, palpitations, dyspnea on exertion, shortness of breath at rest, diaphoresis, nausea/vomiting, back pain, headache, lightheadedness, dizziness, syncope or presyncopal episodes, active bleeding signs or symptoms, excessive weight gain, muscle or joint pain, claudication.       Last Recorded Vitals:  Vitals:    06/24/25 1356   BP: 110/68   BP Location: Left arm   Patient Position: Sitting   BP Cuff Size: Adult   Pulse: 52   SpO2: 96%   Weight: 86 kg (189 lb 9.6 oz)   Height: 1.854 m (6' 1\")       Past Medical History:  He has a past medical history of Cervicalgia, Cervicalgia, Chronic rhinitis (06/21/2019), Migraine, unspecified, not intractable, without status migrainosus (10/29/2019), Obstructive sleep apnea (adult) (pediatric) (11/21/2018), Other intervertebral disc degeneration, lumbar region, Personal history of other diseases of the circulatory system, Personal history of other diseases of the digestive system, Personal history of other diseases of the musculoskeletal system and connective tissue, Personal history of other diseases of the nervous system and sense organs (09/06/2018), Personal history of other diseases of the nervous system and sense organs (05/18/2015), Personal history of other diseases of the nervous system and sense organs (03/16/2015), Personal history of other diseases of the respiratory system (03/30/2015), Personal history of other endocrine, nutritional and metabolic disease, Personal history of other specified conditions, Personal history of other specified conditions (12/05/2016), and Transient global amnesia.    Past " Surgical History:  He has a past surgical history that includes Vasectomy (02/25/2014); Other surgical history (09/06/2018); Other surgical history (03/03/2014); and Appendectomy (03/03/2014).      Social History:  He reports that he has quit smoking. His smoking use included cigarettes, pipe, and cigars. He has never used smokeless tobacco. He reports that he does not currently use alcohol. He reports that he does not currently use drugs.    Family History:  Family History[1]     Allergies:  Penicillins    Outpatient Medications:  Current Outpatient Medications   Medication Instructions    aspirin 81 mg, Daily    busPIRone (BUSPAR) 15 mg, oral, 2 times daily    cetirizine (ZYRTEC) 5 mg, Daily    cholecalciferol (Vitamin D-3) 50 mcg (2,000 unit) capsule Take by mouth.    coenzyme Q10 (CO Q-10) 400 mg, Daily    cyanocobalamin (vitamin B-12) 2,500 mg, Daily    escitalopram (LEXAPRO) 10 mg, oral, Daily    GARLIC EXTRACT ORAL 1 tablet, 2 times daily    indomethacin (INDOCIN) 25 mg, oral, 2 times daily (morning and late afternoon)    metoprolol succinate XL (TOPROL-XL) 25 mg, Daily    multivitamin tablet 1 tablet, Daily    omega-3 fatty acids 500 mg capsule 1 capsule, 2 times daily    omeprazole (PRILOSEC) 20 mg, oral, 2 times daily before meals, Do not crush or chew.    ondansetron (Zofran) 4 mg tablet Take by mouth.    simvastatin (ZOCOR) 40 mg, oral, Nightly    SUMAtriptan (IMITREX) 100 mg, oral, Once as needed, Take at onset of migraine headache. May repeat in 2 hours if needed.    tamsulosin (Flomax) 0.4 mg 24 hr capsule 1 capsule, Daily (0630)    thiamine (VITAMIN B-1) 500 mg, oral, Daily    turmeric root extract 500 mg capsule 1 capsule, 2 times daily       Physical Exam:  Constitutional: awake and alert, oriented ×3, no apparent distress  Skin: warm, dry, good turgor no obvious lesions  Eyes: pupils equal, round, reactive to light, conjunctiva pink and noninjected, no discharge  HENT: normocephalic and  "atraumatic, mucous membranes moist, trachea midline with no masses/goiter  Cardiovascular: S1/S2 regular, no murmur no rubs/gallops, no carotid bruits, no JVD  Pulmonary: symmetrical chest expansion, lungs are clear to auscultation bilaterally, no wheezes/rales/rhonchi, normal effort  Abdomen: nontender, nondistended, active bowel sounds, no ascites  Extremities: no cyanosis, clubbing, no LE edema no lesions; palpable pedal pulses  Neurologic: cranial nerves II - XII grossly intact, stable gait, no tremor       Last Labs:  CBC -  Lab Results   Component Value Date    WBC 6.1 10/23/2024    HGB 15.0 10/23/2024    HCT 46.1 10/23/2024    MCV 87 10/23/2024     10/23/2024       CMP -  Lab Results   Component Value Date    CALCIUM 9.4 10/23/2024    PROT 6.6 10/23/2024    ALBUMIN 4.4 10/23/2024    AST 17 10/23/2024    ALT 14 10/23/2024    ALKPHOS 54 10/23/2024    BILITOT 0.4 10/23/2024       LIPID PANEL -   Lab Results   Component Value Date    CHOL 189 10/23/2024    TRIG 89 10/23/2024    HDL 46.2 10/23/2024    CHHDL 4.1 10/23/2024    LDLF 139 (H) 05/04/2023    VLDL 18 10/23/2024    NHDL 143 10/23/2024       RENAL FUNCTION PANEL -   Lab Results   Component Value Date    GLUCOSE 100 (H) 10/23/2024     10/23/2024    K 4.4 10/23/2024     10/23/2024    CO2 29 10/23/2024    ANIONGAP 12 10/23/2024    BUN 15 10/23/2024    CREATININE 1.12 10/23/2024    GFRMALE 67 05/04/2023    CALCIUM 9.4 10/23/2024    ALBUMIN 4.4 10/23/2024        Lab Results   Component Value Date    HGBA1C 5.8 (H) 10/23/2024       Last Cardiology Tests:  ECG:  No results found for this or any previous visit from the past 1095 days.      Echo:  No results found for this or any previous visit from the past 1095 days.      Ejection Fractions:  No results found for: \"EF\"    Cath:  No results found for this or any previous visit from the past 1095 days.      Stress Test:  No results found for this or any previous visit from the past 1095 " days.      Cardiac Imaging:  CT cardiac scoring wo IV contrast 10/16/2023      Assessment/Plan   Problem List Items Addressed This Visit           ICD-10-CM       Cardiac and Vasculature    Benign essential hypertension I10    Dyslipidemia E78.5    SOB (shortness of breath) on exertion R06.02    Relevant Orders    ECG 12 lead (Clinic Performed)    Crescendo angina (Multi) - Primary I20.0       -RTC in 6 months      Avi Triplett PA-C         [1]   Family History  Problem Relation Name Age of Onset    Alzheimer's disease Mother      Aneurysm Mother          In the stomach.    Alzheimer's disease Father      Heart attack Father          Occurred x4 in his 70s.    Prostate cancer Mother's Brother          5 first cousins that had prostate cancer.    Prostate cancer Maternal Grandfather

## 2025-07-02 ENCOUNTER — APPOINTMENT (OUTPATIENT)
Facility: CLINIC | Age: 70
End: 2025-07-02
Payer: MEDICARE

## 2025-07-02 VITALS
WEIGHT: 188 LBS | HEIGHT: 73 IN | BODY MASS INDEX: 24.92 KG/M2 | SYSTOLIC BLOOD PRESSURE: 117 MMHG | HEART RATE: 63 BPM | DIASTOLIC BLOOD PRESSURE: 70 MMHG

## 2025-07-02 DIAGNOSIS — F41.1 GAD (GENERALIZED ANXIETY DISORDER): ICD-10-CM

## 2025-07-02 DIAGNOSIS — G31.84 MILD COGNITIVE IMPAIRMENT: Primary | ICD-10-CM

## 2025-07-02 DIAGNOSIS — F10.11 HISTORY OF ALCOHOL ABUSE: ICD-10-CM

## 2025-07-02 PROCEDURE — 3008F BODY MASS INDEX DOCD: CPT | Performed by: STUDENT IN AN ORGANIZED HEALTH CARE EDUCATION/TRAINING PROGRAM

## 2025-07-02 PROCEDURE — 3074F SYST BP LT 130 MM HG: CPT | Performed by: STUDENT IN AN ORGANIZED HEALTH CARE EDUCATION/TRAINING PROGRAM

## 2025-07-02 PROCEDURE — 99215 OFFICE O/P EST HI 40 MIN: CPT | Performed by: STUDENT IN AN ORGANIZED HEALTH CARE EDUCATION/TRAINING PROGRAM

## 2025-07-02 PROCEDURE — 3078F DIAST BP <80 MM HG: CPT | Performed by: STUDENT IN AN ORGANIZED HEALTH CARE EDUCATION/TRAINING PROGRAM
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
## 2025-07-02 NOTE — PATIENT INSTRUCTIONS
Thank you for your visit today.  Plese see instructions below:  Please continue lexapro  Please start mediterranean diet  Please follow with a nutritionist  Please continue to exercise.  Continue to take multivitamin  Please call with any worsening symptoms  Please follow up in 3 months.

## 2025-07-08 DIAGNOSIS — I10 BENIGN ESSENTIAL HYPERTENSION: ICD-10-CM

## 2025-07-08 DIAGNOSIS — I20.0 CRESCENDO ANGINA (MULTI): ICD-10-CM

## 2025-07-08 RX ORDER — METOPROLOL SUCCINATE 25 MG/1
25 TABLET, EXTENDED RELEASE ORAL DAILY
Qty: 90 TABLET | Refills: 3 | Status: SHIPPED | OUTPATIENT
Start: 2025-07-08 | End: 2026-07-08

## 2025-07-24 ENCOUNTER — TELEPHONE (OUTPATIENT)
Dept: PRIMARY CARE | Facility: CLINIC | Age: 70
End: 2025-07-24
Payer: MEDICARE

## 2025-07-24 DIAGNOSIS — G43.809 OTHER MIGRAINE WITHOUT STATUS MIGRAINOSUS, NOT INTRACTABLE: ICD-10-CM

## 2025-07-24 RX ORDER — MELOXICAM 15 MG/1
15 TABLET ORAL DAILY PRN
Qty: 30 TABLET | Refills: 11 | Status: SHIPPED | OUTPATIENT
Start: 2025-07-24 | End: 2026-07-24

## 2025-07-24 NOTE — TELEPHONE ENCOUNTER
Pt is amenable to starting on new covered drug meloxicam; says he has stock of indomethacin lying around because he only takes PRN, so he will trial meloxicam and then can always switch back to indomethacin if he does not tolerate.

## 2025-07-25 ENCOUNTER — OFFICE VISIT (OUTPATIENT)
Dept: PRIMARY CARE | Facility: CLINIC | Age: 70
End: 2025-07-25
Payer: MEDICARE

## 2025-07-25 ENCOUNTER — TELEPHONE (OUTPATIENT)
Dept: PRIMARY CARE | Facility: CLINIC | Age: 70
End: 2025-07-25

## 2025-07-25 VITALS
DIASTOLIC BLOOD PRESSURE: 78 MMHG | HEART RATE: 58 BPM | BODY MASS INDEX: 25.37 KG/M2 | SYSTOLIC BLOOD PRESSURE: 152 MMHG | OXYGEN SATURATION: 98 % | WEIGHT: 192.3 LBS | RESPIRATION RATE: 14 BRPM

## 2025-07-25 DIAGNOSIS — L23.7 POISON IVY DERMATITIS: Primary | ICD-10-CM

## 2025-07-25 PROCEDURE — 3077F SYST BP >= 140 MM HG: CPT | Performed by: INTERNAL MEDICINE

## 2025-07-25 PROCEDURE — 99213 OFFICE O/P EST LOW 20 MIN: CPT | Performed by: INTERNAL MEDICINE

## 2025-07-25 PROCEDURE — 3078F DIAST BP <80 MM HG: CPT | Performed by: INTERNAL MEDICINE

## 2025-07-25 PROCEDURE — 1159F MED LIST DOCD IN RCRD: CPT | Performed by: INTERNAL MEDICINE

## 2025-07-25 RX ORDER — PREDNISONE 10 MG/1
TABLET ORAL
Qty: 35 TABLET | Refills: 0 | Status: SHIPPED | OUTPATIENT
Start: 2025-07-25 | End: 2025-08-09

## 2025-07-25 NOTE — PROGRESS NOTES
Subjective   Patient ID: Chavo Jordan Milagro is a 70 y.o. male who presents for Rash (Believes it's poison ivy, 3-4 days ago started. ).    A week ago was moving some wood, unless [place] Tried to avoid touching the wood that had sue on it, but thinks he could have missed it.    3 days ago had a rash and itching appear along his abdomen, penis, and legs. Tried hydrocortisone cream w/ mild relief of itching.        Review of Systems   Skin:  Positive for rash.       /78 (BP Location: Right arm, Patient Position: Sitting)   Pulse 58   Resp 14   Wt 87.2 kg (192 lb 4.8 oz)   SpO2 98%   BMI 25.37 kg/m²   Objective   Physical Exam  Constitutional:       General: He is not in acute distress.     Appearance: He is not ill-appearing, toxic-appearing or diaphoretic.   HENT:      Head: Normocephalic and atraumatic.     Musculoskeletal:      Comments: Scattered papules along LUE, RLE, abdomen, and penis     Neurological:      Mental Status: He is alert.         Assessment/Plan   Problem List Items Addressed This Visit    None  Visit Diagnoses         Codes      Poison ivy dermatitis    -  Primary L23.7    Relevant Medications    predniSONE (Deltasone) 10 mg tablet        -Rash concerning for poison ivy. Given penile rash, will use oral steroids as opposed to a cream. Reviewed side effects w/ pt. All questions answered. To reach out if there are any issues.         Preston Carter MD 07/25/25 9:57 AM

## 2025-08-01 ENCOUNTER — TELEMEDICINE CLINICAL SUPPORT (OUTPATIENT)
Dept: NUTRITION | Facility: HOSPITAL | Age: 70
End: 2025-08-01
Payer: MEDICARE

## 2025-08-01 VITALS — BODY MASS INDEX: 25.49 KG/M2 | WEIGHT: 192.3 LBS | HEIGHT: 73 IN

## 2025-08-01 DIAGNOSIS — G31.84 MILD COGNITIVE IMPAIRMENT: Primary | ICD-10-CM

## 2025-08-01 PROCEDURE — 97802 MEDICAL NUTRITION INDIV IN: CPT

## 2025-08-01 NOTE — PATIENT INSTRUCTIONS
Today we discussed Diabetes plate method, pairing protein and carbohydrate at snacks, meal timing and consistency, reading nutrition labels, physical activity, gastroparesis nutrition strategies.     Goals: 1. Lower blood sugar and cholesterol 2. Increase vegetable intake    Patient Instructions:   1) Eat consistently 3 meals and 1-2 snacks in between meals per day   2) Include a source of protein with each meal and snack  3) Water intake of at least 60-67 oz/day  4) Follow plate method of 1/2 plate non-starchy vegetables, 1/4 plate carbohydrates/starches, and 1/4 plate protein   5) Include physical activity of at least 30 minutes per day

## 2025-08-01 NOTE — PROGRESS NOTES
"Nutrition Initial Assessment:     Patient Chavo Jordan Sr. is a 70 y.o. male being seen via virtual visit, phone call only who was referred by     Dioni Rush MD    on     07/02/2025    for G31.84 (ICD-10-CM) - Mild cognitive impairment     Nutrition Assessment    Problem List[1]  Nutrition History:  Food & Nutrition History: Pt states his blood sugar has gone up. Pt states he used to have a whole pizza and now eats only 1 slice. Pt states he knows he needs to limit somethings but does not know what or how to.  Food Allergies: None   Food Intolerances: None  Vitamin/mineral intake: Multivitamin   (None)  Prescription medications: Prescription Medication Use: Refer to med list  GI Symptoms: Spurts of diarrhea ; Occurring >2 weeks?  Occasionally   Oral Problems:  None  Sleep Habits: 7+ hrs continuous    Diet Recall:  Meal 1: 8-9am: large coffee and 2-3 donuts (once a week) / 6 tostitos / leftovers / 3 cup smoothie  Meal 2: 12-1pm: havarti cheese and cheese  Meal 3: 5-7pm: salad w/ shrimp of garlic chicken and yum yum sauce and walnuts / hamburger / homeade spaghetti / fish  Snacks: Ice cream each night, peanut butter and crackers  Food Variety: Present  Oral Nutrition Supplement Use:  (None)  Fluid Intake: 4 cups coffee per day, water  Energy Intake: Good > 75 %      Food Preparation:  Cooking: Patient, Spouse/Significant Other  Grocery Shopping: Patient, Spouse/Significant Other  Dining Out: 1 to 3 times a month    Physical Activity:   Bicycle (E-Bike 20+ miles multiple times per week)    Food Security/Insecurity:  None indicated    Anthropometrics:  Height: 185.4 cm (6' 1\") (per chart)   Weight: 87.2 kg (192 lb 4.8 oz) (per chart)   BMI (Calculated): 25.38    IBW/kg (Dietitian Calculated): 83.6 kg   Percent of IBW: 104 %          Weight History:   Daily Weight  08/01/25 : 87.2 kg (192 lb 4.8 oz)  07/25/25 : 87.2 kg (192 lb 4.8 oz)  07/02/25 : 85.3 kg (188 lb)  06/24/25 : 86 kg (189 lb 9.6 oz)  05/27/25 : " 87.9 kg (193 lb 12.8 oz)  05/02/25 : 83.8 kg (184 lb 11.2 oz)  04/30/25 : 84 kg (185 lb 3 oz)  11/26/24 : 87 kg (191 lb 14.4 oz)  11/14/24 : 86.8 kg (191 lb 4.8 oz)  10/21/24 : 86.4 kg (190 lb 6.4 oz)    Weight Change %:  Weight History / % Weight Change: 7/11/24 187lb, 10/21/24 190lb, 4/30/25 185lb, 7/2/25 188lb, 7/25/25 192lb (5lb weight gain in 1 year, 2.7% weight gain in 1 year)    Nutrition Focused Physical Exam Findings:  Deferred, appointment is either virtual or phone only    Nutrition Significant Labs:  Last Chem:     Chemistry    Lab Results   Component Value Date/Time     10/23/2024 0801    K 4.4 10/23/2024 0801     10/23/2024 0801    CO2 29 10/23/2024 0801    BUN 15 10/23/2024 0801    CREATININE 1.12 10/23/2024 0801    Lab Results   Component Value Date/Time    CALCIUM 9.4 10/23/2024 0801    ALKPHOS 54 10/23/2024 0801    AST 17 10/23/2024 0801    ALT 14 10/23/2024 0801    BILITOT 0.4 10/23/2024 0801       , CMP Trend:    Recent Labs     10/23/24  0801 05/04/23  0922 04/29/22  1117   GLUCOSE 100* 121* 116*    141 139   K 4.4 4.3 4.3    101 103   CO2 29 30 29   ANIONGAP 12 14 11   BUN 15 11 17   CREATININE 1.12 1.17 1.06   EGFR 71  --   --    CALCIUM 9.4 10.4 9.7   ALBUMIN 4.4 4.9 4.6   ALKPHOS 54 71 62   PROT 6.6 7.2 6.7   AST 17 19 29   BILITOT 0.4 0.7 0.5   ALT 14 18 25   , CBC Trend:   Recent Labs     10/23/24  0801 05/04/23  0922 04/29/22  1117   WBC 6.1 8.6 5.0   NRBC 0.0 0.0 0.0   RBC 5.33 5.66 5.39   HGB 15.0 15.8 15.5   HCT 46.1 48.0 47.1   MCV 87 85 87   MCH 28.1  --   --    MCHC 32.5 32.9 32.9   RDW 13.2 13.2 13.5    261 224   , RFP + Serum Mag Trend:   Recent Labs     10/23/24  0801 05/04/23  0922 04/29/22  1117   GLUCOSE 100* 121* 116*    141 139   K 4.4 4.3 4.3    101 103   CO2 29 30 29   ANIONGAP 12 14 11   BUN 15 11 17   CREATININE 1.12 1.17 1.06   EGFR 71  --   --    CALCIUM 9.4 10.4 9.7   ALBUMIN 4.4 4.9 4.6   , LFT Trend:   Recent Labs      10/23/24  0801 05/04/23  0922 04/29/22  1117   ALBUMIN 4.4 4.9 4.6   BILITOT 0.4 0.7 0.5   ALKPHOS 54 71 62   ALT 14 18 25   AST 17 19 29   PROT 6.6 7.2 6.7   , DM Specific Labs Trend (Includes HgbA1C, antibodies & fasting insulin):   Recent Labs     10/23/24  0801 05/04/23  0922   HGBA1C 5.8* 5.8*   , Lipid Panel Trend:    Recent Labs     10/23/24  0801 05/04/23  0922 04/29/22  1117 05/29/20  0822   CHOL 189 213* 221* 200*   HDL 46.2 51.3 46.6 46.2   LDLCALC 125*  --   --   --    LDLF  --  139* 157* 135*   VLDL 18 23 18 19   TRIG 89 114 88 95   , Iron Panel + Serum Ferritin Trend:   Recent Labs     09/11/24  0811   IRON 86   UIBC 254   TIBC 340   IRONSAT 25   FERRITIN 83   , Vitamin B12:   Lab Results   Component Value Date    RXGFDMDN08 1,916 (H) 05/27/2025    , Folate:   Lab Results   Component Value Date    FOLATE 21.0 04/29/2022    , Vitamin D:   Lab Results   Component Value Date    VITD25 41 04/29/2022        Medications:  Current Outpatient Medications   Medication Instructions    aspirin 81 mg, Daily    busPIRone (BUSPAR) 15 mg, oral, 2 times daily    cetirizine (ZYRTEC) 5 mg, Daily    cholecalciferol (Vitamin D-3) 50 mcg (2,000 unit) capsule Take by mouth.    coenzyme Q10 (CO Q-10) 400 mg, Daily    cyanocobalamin (vitamin B-12) 2,500 mg, Daily    escitalopram (LEXAPRO) 10 mg, oral, Daily    GARLIC EXTRACT ORAL 1 tablet, 2 times daily    indomethacin (INDOCIN) 25 mg, oral, 2 times daily (morning and late afternoon)    meloxicam (MOBIC) 15 mg, oral, Daily PRN    metoprolol succinate XL (TOPROL-XL) 25 mg, oral, Daily, Do not crush or chew.    multivitamin tablet 1 tablet, Daily    omega-3 fatty acids 500 mg capsule 1 capsule, 2 times daily    omeprazole (PRILOSEC) 20 mg, oral, 2 times daily before meals, Do not crush or chew.    ondansetron (Zofran) 4 mg tablet Take by mouth.    predniSONE (Deltasone) 10 mg tablet Take 4 tablets (40 mg) by mouth once daily for 5 days, THEN 2 tablets (20 mg) once daily for 5  days, THEN 1 tablet (10 mg) once daily for 5 days.    simvastatin (ZOCOR) 40 mg, oral, Nightly    SUMAtriptan (IMITREX) 100 mg, oral, Once as needed, Take at onset of migraine headache. May repeat in 2 hours if needed.    tamsulosin (Flomax) 0.4 mg 24 hr capsule 1 capsule, Daily (0630)    thiamine (VITAMIN B-1) 500 mg, oral, Daily    turmeric root extract 500 mg capsule 1 capsule, 2 times daily        Estimated Needs:   ; Method for Estimating Needs: 6017-9294 kcal per day (MSJ x AF - 300-500kcal for deficit)   ; Method for Estimating 24 Hour Protein Needs: 70-87g protein/day (0.8-1 g/kg)   ;     ; Method for Estimating 24 Hour Fluid Needs: 60-67oz/day at least (1 ml/kcal)         Nutrition Diagnosis   Malnutrition Diagnosis  Patient has Malnutrition Diagnosis: No    Nutrition Diagnosis  Patient has Nutrition Diagnosis: Yes  Diagnosis Status (1): New  Nutrition Diagnosis 1: Inconsistent carbohydrate intake  Related to (1): intake of refined carbohydrates  As Evidenced by (1): donuts, pastries, pies, and ice cream       Nutrition Interventions/Recommendations   Nutrition Prescription:  Oral nutrition: CHO controlled diet, Increased Fiber, Increased Protein    Nutrition Interventions:   Food and Nutrient Delivery: Meals & Snacks: Carbohydrate-modified diet, Fiber-modified diet, Protein-modified diet  Goals: Pt will follow CHO counting/controlled diet, pairing CHO with protein and higher fiber sources of CHO.       Nutrition Education:   Nutrition Education Content: Content related nutrition education  Goals: Patient goals: 1. Lower blood sugar and cholesterol 2. Increase vegetable intake    Nutrition Education Topics Discussed:   Diabetes plate method, pairing protein and carbohydrate at snacks, meal timing and consistency, reading nutrition labels, physical activity, gastroparesis nutrition strategies     Educational Handouts Provided: will mail patient: HUMBERTO Diabetes Plate Method, ADA Best Foods, ADA Best Snacks, ADA  Physical Activity, NCM Gastroparesis Nutrition Therapy       Nutrition Counseling: Nutrition Counseling Strategies : Nutrition counseling based on motivational interviewing strategy, Nutrition counseling based on goal setting strategy    Patient Goals: 1. Lower blood sugar and cholesterol 2. Increase vegetable intake    Readiness to Change : Good  Level of Understanding : Good  Anticipated Compliant : Fair    Nutrition Monitoring and Evaluation   Food and Nutrient Intake  Monitoring and Evaluation Plan: Meal/snack pattern  Criteria: Consume 3 meal following Diabetes plate method and up to 2 snacks per day pairing carbohydrate and protein    Anthropometric measurements  Monitoring and Evaluation Plan: Weight change  Body Weight Change: Body weight loss    Biochemical Data, Medical Tests and Procedures  Monitoring and Evaluation Plan: Glucose/endocrine profile  Glucose/Endocrine Profile: Glucose, fasting, Hemoglobin A1c (HgbA1c)      Follow Up: If you would like to schedule a follow up please call Central Scheduling at [phone]. Thank you!     Time Spent  Prep time on day of patient encounter: 5 minutes  Time spent directly with patient, family or caregiver: 54 minutes  Documentation Time: 15 minutes           [1]   Patient Active Problem List  Diagnosis    Adjustment disorder with mixed anxiety and depressed mood    Age-related cognitive decline    Alcoholism in remission (Multi)    Arthritis    Laryngitis    Asthma    Reactive airway disease (HHS-HCC)    Atypical nevus of scalp    Back pain, chronic    Low back pain    Hernandez's esophagus    Benign essential hypertension    Carpal tunnel syndrome    Arthritis of neck    Cervical radiculopathy    Stenosis, cervical spine    Chest pain, atypical    Chronic GERD    Chronic migraine without aura, with intractable migraine, so stated, with status migrainosus    Allergic rhinitis    Chronic rhinitis    Constipation    Depression with anxiety    Depression     Displacement of intervertebral disc of cervical region    Dysphagia    Headache    Medication overuse headache    High frequency sensorineural hearing loss of left ear    Hyperglycemia    Dyslipidemia    Lightheadedness    Lung nodule    Memory loss    Myofascial pain on left side    Nocturia    Paraesophageal hiatal hernia    PLMD (periodic limb movement disorder)    Shortness of breath on exertion    Spondylosis of cervical region without myelopathy or radiculopathy    TIA (transient ischemic attack)    Tinnitus    Vitamin B 12 deficiency    Vitamin D deficiency    Belching    Functional diarrhea    Gastroparesis    Generalized osteoarthritis    Memory change    Chronic back pain    Cervicalgia    Otalgia    SOB (shortness of breath) on exertion    Analgesic overuse headache    Migraine    Migraine without aura    Cognitive disorder    Anxiety    History of hypercholesterolemia    Transient global amnesia    Nasal congestion    Encounter to discuss test results    Crescendo angina (Multi)

## 2025-10-15 ENCOUNTER — APPOINTMENT (OUTPATIENT)
Facility: CLINIC | Age: 70
End: 2025-10-15
Payer: MEDICARE

## 2025-11-11 ENCOUNTER — APPOINTMENT (OUTPATIENT)
Dept: PRIMARY CARE | Facility: CLINIC | Age: 70
End: 2025-11-11
Payer: MEDICARE

## 2025-12-09 ENCOUNTER — APPOINTMENT (OUTPATIENT)
Dept: CARDIOLOGY | Facility: CLINIC | Age: 70
End: 2025-12-09
Payer: MEDICARE